# Patient Record
Sex: FEMALE | Race: WHITE | Employment: FULL TIME | URBAN - METROPOLITAN AREA
[De-identification: names, ages, dates, MRNs, and addresses within clinical notes are randomized per-mention and may not be internally consistent; named-entity substitution may affect disease eponyms.]

---

## 2017-04-17 ENCOUNTER — HOSPITAL ENCOUNTER (EMERGENCY)
Facility: HOSPITAL | Age: 47
Discharge: HOME | End: 2017-04-17
Attending: EMERGENCY MEDICINE
Payer: COMMERCIAL

## 2017-04-17 ENCOUNTER — APPOINTMENT (OUTPATIENT)
Dept: GENERAL RADIOLOGY | Facility: HOSPITAL | Age: 47
End: 2017-04-17
Attending: EMERGENCY MEDICINE
Payer: COMMERCIAL

## 2017-04-17 VITALS
DIASTOLIC BLOOD PRESSURE: 82 MMHG | HEART RATE: 101 BPM | RESPIRATION RATE: 20 BRPM | OXYGEN SATURATION: 96 % | HEIGHT: 65 IN | SYSTOLIC BLOOD PRESSURE: 149 MMHG | BODY MASS INDEX: 47.82 KG/M2 | WEIGHT: 287 LBS

## 2017-04-17 DIAGNOSIS — R00.2 PALPITATIONS: Primary | ICD-10-CM

## 2017-04-17 PROCEDURE — 80048 BASIC METABOLIC PNL TOTAL CA: CPT | Performed by: EMERGENCY MEDICINE

## 2017-04-17 PROCEDURE — 71010 XR CHEST AP PORTABLE  (CPT=71010): CPT

## 2017-04-17 PROCEDURE — 93010 ELECTROCARDIOGRAM REPORT: CPT | Performed by: EMERGENCY MEDICINE

## 2017-04-17 PROCEDURE — 99285 EMERGENCY DEPT VISIT HI MDM: CPT

## 2017-04-17 PROCEDURE — 84484 ASSAY OF TROPONIN QUANT: CPT | Performed by: EMERGENCY MEDICINE

## 2017-04-17 PROCEDURE — 93005 ELECTROCARDIOGRAM TRACING: CPT

## 2017-04-17 PROCEDURE — 85610 PROTHROMBIN TIME: CPT | Performed by: EMERGENCY MEDICINE

## 2017-04-17 PROCEDURE — 36415 COLL VENOUS BLD VENIPUNCTURE: CPT

## 2017-04-17 PROCEDURE — 84443 ASSAY THYROID STIM HORMONE: CPT | Performed by: EMERGENCY MEDICINE

## 2017-04-17 PROCEDURE — 85025 COMPLETE CBC W/AUTO DIFF WBC: CPT | Performed by: EMERGENCY MEDICINE

## 2017-04-17 PROCEDURE — 85379 FIBRIN DEGRADATION QUANT: CPT | Performed by: EMERGENCY MEDICINE

## 2017-04-17 NOTE — ED PROVIDER NOTES
Patient Seen in: [facility] AND [facility] Emergency Department    History   Patient presents with:  Arrythmia/Palpitations (cardiovascular)    Stated Complaint: palpitations    HPI    56year old female who is otherwise healthy but reports that she has been sly Indications: Underactive Thyroid   aspirin 81 MG Oral Tab,  Take 81 mg by mouth daily.  Indications: pregnancy       Family History   Problem Relation Age of Onset   • Heart Disorder Father          Smoking Status: Never Smoker                      Alcohol reviewed.           ED Course     Labs Reviewed   BASIC METABOLIC PANEL (8) - Abnormal; Notable for the following:     Glucose 148 (*)     GFR, Non- 59 (*)     All other components within normal limits   CBC W/ DIFFERENTIAL - Abnormal; Notab including sob, cp, palpitations, or other acute concerns.        Disposition and Plan     Clinical Impression:  Palpitations  (primary encounter diagnosis)    Disposition:  Discharge    Follow-up:  Your primary care doctor    In 2 days  Call for next availa

## 2017-04-17 NOTE — ED NOTES
Pt to Ed from work with c/o chest pressure and heart palpitations since this am after drinking coffee and taking diet supplement.  Pt hr is 130 no sob at this time skin warm dry and normal in color   Labs drawn ekg done and cxr

## 2017-04-17 NOTE — ED INITIAL ASSESSMENT (HPI)
Pt c/o heart racing while sitting at her desk at work.  Feeling discomfort to her chest. Taking spirinolactone for adult acne and drank a boost this morning

## 2018-05-29 ENCOUNTER — OFFICE VISIT (OUTPATIENT)
Dept: INTERNAL MEDICINE CLINIC | Facility: CLINIC | Age: 48
End: 2018-05-29

## 2018-05-29 VITALS
WEIGHT: 293 LBS | BODY MASS INDEX: 48.23 KG/M2 | TEMPERATURE: 98 F | HEIGHT: 65.5 IN | OXYGEN SATURATION: 97 % | SYSTOLIC BLOOD PRESSURE: 122 MMHG | DIASTOLIC BLOOD PRESSURE: 82 MMHG | RESPIRATION RATE: 16 BRPM | HEART RATE: 81 BPM

## 2018-05-29 DIAGNOSIS — Z13.220 LIPID SCREENING: ICD-10-CM

## 2018-05-29 DIAGNOSIS — Z00.00 LABORATORY EXAMINATION ORDERED AS PART OF A ROUTINE GENERAL MEDICAL EXAMINATION: ICD-10-CM

## 2018-05-29 DIAGNOSIS — E04.1 THYROID NODULE: ICD-10-CM

## 2018-05-29 DIAGNOSIS — Z13.29 THYROID DISORDER SCREEN: ICD-10-CM

## 2018-05-29 DIAGNOSIS — Z00.00 PHYSICAL EXAM, ANNUAL: Primary | ICD-10-CM

## 2018-05-29 DIAGNOSIS — Z12.31 ENCOUNTER FOR SCREENING MAMMOGRAM FOR MALIGNANT NEOPLASM OF BREAST: ICD-10-CM

## 2018-05-29 DIAGNOSIS — Z13.0 SCREENING, ANEMIA, DEFICIENCY, IRON: ICD-10-CM

## 2018-05-29 DIAGNOSIS — Z13.89 SCREENING FOR GENITOURINARY CONDITION: ICD-10-CM

## 2018-05-29 PROBLEM — G25.0 ESSENTIAL TREMOR: Status: ACTIVE | Noted: 2018-05-29

## 2018-05-29 PROCEDURE — 99386 PREV VISIT NEW AGE 40-64: CPT | Performed by: INTERNAL MEDICINE

## 2018-05-29 NOTE — PROGRESS NOTES
HPI:    Patient ID: Navdeep Ceja is a 53year old female. HPI  HPI:   Navdeep Ceja is a 53year old female who presents for a complete physical exam. Symptoms: denies discharge, itching, burning or dysuria, periods are regular.  Patient complains minimal.  Diet: watches minimally     REVIEW OF SYSTEMS:   GENERAL: feels well otherwise  SKIN: denies any unusual skin lesions  EYES:denies blurred vision or double vision  HEENT: denies nasal congestion, sinus pain or ST  LUNGS: denies shortness of breat for CPX in 12 m. Review of Systems         Current Outpatient Prescriptions:  Acidophilus/Pectin Oral Cap Take 1 capsule by mouth daily. Disp:  Rfl:    Loperamide HCl 2 MG Oral Cap Take 2 mg by mouth 4 (four) times daily as needed for Diarrhea.  Disp:  R

## 2018-06-05 ENCOUNTER — LAB ENCOUNTER (OUTPATIENT)
Dept: LAB | Age: 48
End: 2018-06-05
Attending: INTERNAL MEDICINE
Payer: COMMERCIAL

## 2018-06-05 DIAGNOSIS — Z00.00 LABORATORY EXAMINATION ORDERED AS PART OF A ROUTINE GENERAL MEDICAL EXAMINATION: ICD-10-CM

## 2018-06-05 DIAGNOSIS — Z13.0 SCREENING, ANEMIA, DEFICIENCY, IRON: ICD-10-CM

## 2018-06-05 DIAGNOSIS — Z13.220 LIPID SCREENING: ICD-10-CM

## 2018-06-05 DIAGNOSIS — Z13.29 THYROID DISORDER SCREEN: ICD-10-CM

## 2018-06-05 DIAGNOSIS — Z13.89 SCREENING FOR GENITOURINARY CONDITION: ICD-10-CM

## 2018-06-05 PROCEDURE — 80053 COMPREHEN METABOLIC PANEL: CPT

## 2018-06-05 PROCEDURE — 81001 URINALYSIS AUTO W/SCOPE: CPT

## 2018-06-05 PROCEDURE — 84443 ASSAY THYROID STIM HORMONE: CPT

## 2018-06-05 PROCEDURE — 85025 COMPLETE CBC W/AUTO DIFF WBC: CPT

## 2018-06-05 PROCEDURE — 83036 HEMOGLOBIN GLYCOSYLATED A1C: CPT

## 2018-06-05 PROCEDURE — 36415 COLL VENOUS BLD VENIPUNCTURE: CPT

## 2018-06-05 PROCEDURE — 80061 LIPID PANEL: CPT

## 2018-06-08 ENCOUNTER — TELEPHONE (OUTPATIENT)
Dept: INTERNAL MEDICINE CLINIC | Facility: CLINIC | Age: 48
End: 2018-06-08

## 2018-06-08 ENCOUNTER — HOSPITAL ENCOUNTER (OUTPATIENT)
Dept: MAMMOGRAPHY | Age: 48
Discharge: HOME | End: 2018-06-08
Attending: INTERNAL MEDICINE
Payer: COMMERCIAL

## 2018-06-08 ENCOUNTER — HOSPITAL ENCOUNTER (OUTPATIENT)
Dept: ULTRASOUND IMAGING | Age: 48
Discharge: HOME | End: 2018-06-08
Attending: INTERNAL MEDICINE
Payer: COMMERCIAL

## 2018-06-08 DIAGNOSIS — Z12.31 ENCOUNTER FOR SCREENING MAMMOGRAM FOR MALIGNANT NEOPLASM OF BREAST: ICD-10-CM

## 2018-06-08 DIAGNOSIS — R92.8 ABNORMAL MAMMOGRAM: Primary | ICD-10-CM

## 2018-06-08 DIAGNOSIS — E04.1 THYROID NODULE: ICD-10-CM

## 2018-06-08 PROCEDURE — 77067 SCR MAMMO BI INCL CAD: CPT | Performed by: INTERNAL MEDICINE

## 2018-06-08 PROCEDURE — 76536 US EXAM OF HEAD AND NECK: CPT | Performed by: INTERNAL MEDICINE

## 2018-06-08 PROCEDURE — 77063 BREAST TOMOSYNTHESIS BI: CPT | Performed by: INTERNAL MEDICINE

## 2018-06-08 NOTE — TELEPHONE ENCOUNTER
----- Message from Alvina Kirkland MD sent at 6/8/2018 11:29 AM CDT -----  Small nodule (right), recheck thyroid US in 1 year to ensure stability

## 2018-06-14 ENCOUNTER — TELEPHONE (OUTPATIENT)
Dept: INTERNAL MEDICINE CLINIC | Facility: CLINIC | Age: 48
End: 2018-06-14

## 2018-06-14 ENCOUNTER — HOSPITAL ENCOUNTER (OUTPATIENT)
Dept: MAMMOGRAPHY | Age: 48
Discharge: HOME | End: 2018-06-14
Attending: INTERNAL MEDICINE
Payer: COMMERCIAL

## 2018-06-14 ENCOUNTER — HOSPITAL ENCOUNTER (OUTPATIENT)
Dept: ULTRASOUND IMAGING | Age: 48
Discharge: HOME | End: 2018-06-14
Attending: INTERNAL MEDICINE
Payer: COMMERCIAL

## 2018-06-14 DIAGNOSIS — N60.01 CYST OF RIGHT BREAST: Primary | ICD-10-CM

## 2018-06-14 DIAGNOSIS — R92.1 BREAST CALCIFICATION, LEFT: ICD-10-CM

## 2018-06-14 DIAGNOSIS — R92.8 ABNORMAL MAMMOGRAM: ICD-10-CM

## 2018-06-14 PROCEDURE — 76642 ULTRASOUND BREAST LIMITED: CPT | Performed by: INTERNAL MEDICINE

## 2018-06-14 PROCEDURE — 77066 DX MAMMO INCL CAD BI: CPT | Performed by: INTERNAL MEDICINE

## 2018-06-14 PROCEDURE — 77062 BREAST TOMOSYNTHESIS BI: CPT | Performed by: INTERNAL MEDICINE

## 2018-06-14 NOTE — TELEPHONE ENCOUNTER
----- Message from Pepe Almonte MD sent at 6/14/2018  9:25 AM CDT -----  Pt notified by radiology to have mammogram in 6 m

## 2019-03-12 ENCOUNTER — OFFICE VISIT (OUTPATIENT)
Dept: INTERNAL MEDICINE CLINIC | Facility: CLINIC | Age: 49
End: 2019-03-12
Payer: COMMERCIAL

## 2019-03-12 VITALS
RESPIRATION RATE: 16 BRPM | HEIGHT: 65.5 IN | OXYGEN SATURATION: 98 % | DIASTOLIC BLOOD PRESSURE: 84 MMHG | HEART RATE: 86 BPM | TEMPERATURE: 98 F | BODY MASS INDEX: 48 KG/M2 | SYSTOLIC BLOOD PRESSURE: 138 MMHG

## 2019-03-12 DIAGNOSIS — J02.9 SORE THROAT: Primary | ICD-10-CM

## 2019-03-12 DIAGNOSIS — J02.0 STREP THROAT: ICD-10-CM

## 2019-03-12 LAB
CONTROL LINE PRESENT WITH A CLEAR BACKGROUND (YES/NO): YES YES/NO
STREP GRP A CUL-SCR: POSITIVE

## 2019-03-12 PROCEDURE — 99213 OFFICE O/P EST LOW 20 MIN: CPT | Performed by: PHYSICIAN ASSISTANT

## 2019-03-12 PROCEDURE — 87880 STREP A ASSAY W/OPTIC: CPT | Performed by: PHYSICIAN ASSISTANT

## 2019-03-12 RX ORDER — AMOXICILLIN 875 MG/1
875 TABLET, COATED ORAL 2 TIMES DAILY
Qty: 20 TABLET | Refills: 0 | Status: SHIPPED | OUTPATIENT
Start: 2019-03-12 | End: 2019-07-24

## 2019-03-12 NOTE — PROGRESS NOTES
HPI:   Buffy Preciado is a 53year old female who presents for upper respiratory symptoms for  3  days. Patient reports sore throat, white spots on tonsils, chills, right ear 'tickling'/pain.  Patient denies swollen glands, cough, chest congestion or cassi Pulse 86   Temp 98.3 °F (36.8 °C) (Oral)   Resp 16   Ht 65.5\"   LMP 03/08/2019 (Approximate)   SpO2 98%   BMI 48.34 kg/m²   GENERAL: well developed, well nourished,in no apparent distress  SKIN: no rashes, no suspicious lesions  EYES: PERRLA, EOMI, conjun

## 2019-03-29 ENCOUNTER — TELEPHONE (OUTPATIENT)
Dept: INTERNAL MEDICINE CLINIC | Facility: CLINIC | Age: 49
End: 2019-03-29

## 2019-03-29 DIAGNOSIS — N60.01 CYST OF RIGHT BREAST: Primary | ICD-10-CM

## 2019-03-29 DIAGNOSIS — R92.1 BREAST CALCIFICATION, LEFT: ICD-10-CM

## 2019-03-29 NOTE — TELEPHONE ENCOUNTER
The pt is scheduled to have a bilateral diagnostic mammogram completed to FU on right breast cyst and a left breast calcification. The radiology department is needing a bilateral complete Breast US order entered in case additional views are needed.  The ord

## 2019-04-04 ENCOUNTER — HOSPITAL ENCOUNTER (OUTPATIENT)
Dept: MAMMOGRAPHY | Age: 49
Discharge: HOME | End: 2019-04-04
Attending: INTERNAL MEDICINE
Payer: COMMERCIAL

## 2019-04-04 DIAGNOSIS — R92.1 BREAST CALCIFICATIONS: Primary | ICD-10-CM

## 2019-04-04 DIAGNOSIS — N60.01 CYST OF RIGHT BREAST: ICD-10-CM

## 2019-04-04 DIAGNOSIS — R92.1 BREAST CALCIFICATION, LEFT: ICD-10-CM

## 2019-04-04 PROCEDURE — 77066 DX MAMMO INCL CAD BI: CPT | Performed by: INTERNAL MEDICINE

## 2019-04-04 PROCEDURE — 77062 BREAST TOMOSYNTHESIS BI: CPT | Performed by: INTERNAL MEDICINE

## 2019-06-05 ENCOUNTER — TELEPHONE (OUTPATIENT)
Dept: INTERNAL MEDICINE CLINIC | Facility: CLINIC | Age: 49
End: 2019-06-05

## 2019-06-05 DIAGNOSIS — E04.1 RIGHT THYROID NODULE: Primary | ICD-10-CM

## 2019-06-05 NOTE — TELEPHONE ENCOUNTER
Pt due for 1 year thyroid u/s f/u. Order placed and message left informing pt to schedule and with # to schedule.

## 2019-07-24 ENCOUNTER — OFFICE VISIT (OUTPATIENT)
Dept: INTERNAL MEDICINE CLINIC | Facility: CLINIC | Age: 49
End: 2019-07-24
Payer: COMMERCIAL

## 2019-07-24 VITALS
HEIGHT: 65.5 IN | BODY MASS INDEX: 48.23 KG/M2 | DIASTOLIC BLOOD PRESSURE: 80 MMHG | SYSTOLIC BLOOD PRESSURE: 108 MMHG | TEMPERATURE: 98 F | RESPIRATION RATE: 20 BRPM | WEIGHT: 293 LBS | HEART RATE: 113 BPM

## 2019-07-24 DIAGNOSIS — Z13.0 SCREENING FOR ENDOCRINE, NUTRITIONAL, METABOLIC AND IMMUNITY DISORDER: ICD-10-CM

## 2019-07-24 DIAGNOSIS — Z13.29 SCREENING FOR ENDOCRINE, NUTRITIONAL, METABOLIC AND IMMUNITY DISORDER: ICD-10-CM

## 2019-07-24 DIAGNOSIS — Z13.220 SCREENING FOR LIPID DISORDERS: ICD-10-CM

## 2019-07-24 DIAGNOSIS — Z13.228 SCREENING FOR ENDOCRINE, NUTRITIONAL, METABOLIC AND IMMUNITY DISORDER: ICD-10-CM

## 2019-07-24 DIAGNOSIS — Z12.4 SCREENING FOR CERVICAL CANCER: ICD-10-CM

## 2019-07-24 DIAGNOSIS — R05.9 COUGH: Primary | ICD-10-CM

## 2019-07-24 DIAGNOSIS — Z13.89 SCREENING FOR GENITOURINARY CONDITION: ICD-10-CM

## 2019-07-24 DIAGNOSIS — Z13.1 SCREENING FOR DIABETES MELLITUS: ICD-10-CM

## 2019-07-24 DIAGNOSIS — Z13.21 SCREENING FOR ENDOCRINE, NUTRITIONAL, METABOLIC AND IMMUNITY DISORDER: ICD-10-CM

## 2019-07-24 PROCEDURE — 99213 OFFICE O/P EST LOW 20 MIN: CPT | Performed by: NURSE PRACTITIONER

## 2019-07-24 RX ORDER — PREDNISONE 20 MG/1
40 TABLET ORAL DAILY
Qty: 14 TABLET | Refills: 0 | Status: SHIPPED | OUTPATIENT
Start: 2019-07-24 | End: 2019-07-31

## 2019-07-24 RX ORDER — PSEUDOEPHEDRINE HYDROCHLORIDE 30 MG/1
30 TABLET ORAL EVERY 4 HOURS PRN
COMMUNITY
End: 2019-11-14 | Stop reason: ALTCHOICE

## 2019-07-24 NOTE — PROGRESS NOTES
HPI:    Patient ID: Willy Smith is a 51year old female. Patient presents with:  Cough: end of June      Viral cold about 3 weeks ago    Cough   This is a recurrent problem. The current episode started 1 to 4 weeks ago.  The problem has been waxing Marital status:       Spouse name: Not on file      Number of children: Not on file      Years of education: Not on file      Highest education level: Not on file    Tobacco Use      Smoking status: Never Smoker      Smokeless tobacco: Never Used Results   Component Value Date    CHOLEST 170 06/05/2018    TRIG 221 (H) 06/05/2018    HDL 49 06/05/2018    LDL 77 06/05/2018    VLDL 44 (H) 06/05/2018    TCHDLRATIO 3.47 06/05/2018    [place] 121 06/05/2018     Lab Results   Component Value Date    EAG 11 Future  -     COMP METABOLIC PANEL (14);  Future  -     TSH W REFLEX TO FREE T4; Future  -     URINALYSIS, ROUTINE; Future    Screening for diabetes mellitus  -     HEMOGLOBIN A1C; Future        Jennifer Bowman, APRN

## 2019-08-05 ENCOUNTER — TELEPHONE (OUTPATIENT)
Dept: INTERNAL MEDICINE CLINIC | Facility: CLINIC | Age: 49
End: 2019-08-05

## 2019-08-05 DIAGNOSIS — R05.9 COUGH: Primary | ICD-10-CM

## 2019-08-05 NOTE — TELEPHONE ENCOUNTER
He pt was seen in the office on 7/24/2019 for a cough. The pt was treated with prednisone 40mg daily for 7 days. The pt finished the prednisone 5 days ago. The pt states the symptoms have \"marginally\" improved.  The pt is still having a continued coug

## 2019-08-05 NOTE — TELEPHONE ENCOUNTER
Pt saw Kimberly Junior on 7/24 said if the treatment didn't work she could have a lung x ray ordered. Pt is looking for a lung x ray order now.

## 2019-08-07 ENCOUNTER — HOSPITAL ENCOUNTER (OUTPATIENT)
Dept: GENERAL RADIOLOGY | Age: 49
Discharge: HOME | End: 2019-08-07
Attending: INTERNAL MEDICINE
Payer: COMMERCIAL

## 2019-08-07 ENCOUNTER — TELEPHONE (OUTPATIENT)
Dept: INTERNAL MEDICINE CLINIC | Facility: CLINIC | Age: 49
End: 2019-08-07

## 2019-08-07 ENCOUNTER — APPOINTMENT (OUTPATIENT)
Dept: GENERAL RADIOLOGY | Age: 49
End: 2019-08-07
Attending: INTERNAL MEDICINE
Payer: COMMERCIAL

## 2019-08-07 ENCOUNTER — LAB ENCOUNTER (OUTPATIENT)
Dept: LAB | Age: 49
End: 2019-08-07
Attending: NURSE PRACTITIONER
Payer: COMMERCIAL

## 2019-08-07 ENCOUNTER — HOSPITAL ENCOUNTER (OUTPATIENT)
Dept: ULTRASOUND IMAGING | Age: 49
Discharge: HOME | End: 2019-08-07
Attending: INTERNAL MEDICINE
Payer: COMMERCIAL

## 2019-08-07 DIAGNOSIS — Z13.89 SCREENING FOR GENITOURINARY CONDITION: ICD-10-CM

## 2019-08-07 DIAGNOSIS — R05.9 COUGH: ICD-10-CM

## 2019-08-07 DIAGNOSIS — Z13.228 SCREENING FOR ENDOCRINE, NUTRITIONAL, METABOLIC AND IMMUNITY DISORDER: ICD-10-CM

## 2019-08-07 DIAGNOSIS — R05.3 CHRONIC COUGH: Primary | ICD-10-CM

## 2019-08-07 DIAGNOSIS — E04.1 RIGHT THYROID NODULE: ICD-10-CM

## 2019-08-07 DIAGNOSIS — Z13.0 SCREENING FOR ENDOCRINE, NUTRITIONAL, METABOLIC AND IMMUNITY DISORDER: ICD-10-CM

## 2019-08-07 DIAGNOSIS — Z13.220 SCREENING FOR LIPID DISORDERS: ICD-10-CM

## 2019-08-07 DIAGNOSIS — Z13.1 SCREENING FOR DIABETES MELLITUS: ICD-10-CM

## 2019-08-07 DIAGNOSIS — Z13.21 SCREENING FOR ENDOCRINE, NUTRITIONAL, METABOLIC AND IMMUNITY DISORDER: ICD-10-CM

## 2019-08-07 DIAGNOSIS — Z13.29 SCREENING FOR ENDOCRINE, NUTRITIONAL, METABOLIC AND IMMUNITY DISORDER: ICD-10-CM

## 2019-08-07 LAB
ALBUMIN SERPL-MCNC: 3.4 G/DL (ref 3.4–5)
ALBUMIN/GLOB SERPL: 1 {RATIO} (ref 1–2)
ALP LIVER SERPL-CCNC: 62 U/L (ref 39–100)
ALT SERPL-CCNC: 27 U/L (ref 13–56)
ANION GAP SERPL CALC-SCNC: 6 MMOL/L (ref 0–18)
AST SERPL-CCNC: 16 U/L (ref 15–37)
BASOPHILS # BLD AUTO: 0.02 X10(3) UL (ref 0–0.2)
BASOPHILS NFR BLD AUTO: 0.3 %
BILIRUB SERPL-MCNC: 0.5 MG/DL (ref 0.1–2)
BILIRUB UR QL STRIP.AUTO: NEGATIVE
BUN BLD-MCNC: 12 MG/DL (ref 7–18)
BUN/CREAT SERPL: 11.9 (ref 10–20)
CALCIUM BLD-MCNC: 9.3 MG/DL (ref 8.5–10.1)
CHLORIDE SERPL-SCNC: 106 MMOL/L (ref 98–112)
CHOLEST SMN-MCNC: 175 MG/DL (ref ?–200)
CO2 SERPL-SCNC: 27 MMOL/L (ref 21–32)
CREAT BLD-MCNC: 1.01 MG/DL (ref 0.55–1.02)
DEPRECATED RDW RBC AUTO: 47.2 FL (ref 35.1–46.3)
EOSINOPHIL # BLD AUTO: 0.29 X10(3) UL (ref 0–0.7)
EOSINOPHIL NFR BLD AUTO: 3.9 %
ERYTHROCYTE [DISTWIDTH] IN BLOOD BY AUTOMATED COUNT: 17.4 % (ref 11–15)
EST. AVERAGE GLUCOSE BLD GHB EST-MCNC: 123 MG/DL (ref 68–126)
GLOBULIN PLAS-MCNC: 3.5 G/DL (ref 2.8–4.4)
GLUCOSE BLD-MCNC: 85 MG/DL (ref 70–99)
GLUCOSE UR STRIP.AUTO-MCNC: NEGATIVE MG/DL
HBA1C MFR BLD HPLC: 5.9 % (ref ?–5.7)
HCT VFR BLD AUTO: 40.5 % (ref 35–48)
HDLC SERPL-MCNC: 50 MG/DL (ref 40–59)
HGB BLD-MCNC: 12.4 G/DL (ref 12–16)
IMM GRANULOCYTES # BLD AUTO: 0.05 X10(3) UL (ref 0–1)
IMM GRANULOCYTES NFR BLD: 0.7 %
KETONES UR STRIP.AUTO-MCNC: NEGATIVE MG/DL
LDLC SERPL CALC-MCNC: 85 MG/DL (ref ?–100)
LYMPHOCYTES # BLD AUTO: 2.06 X10(3) UL (ref 1–4)
LYMPHOCYTES NFR BLD AUTO: 27.6 %
M PROTEIN MFR SERPL ELPH: 6.9 G/DL (ref 6.4–8.2)
MCH RBC QN AUTO: 23.9 PG (ref 26–34)
MCHC RBC AUTO-ENTMCNC: 30.6 G/DL (ref 31–37)
MCV RBC AUTO: 78 FL (ref 80–100)
MONOCYTES # BLD AUTO: 0.49 X10(3) UL (ref 0.1–1)
MONOCYTES NFR BLD AUTO: 6.6 %
NEUTROPHILS # BLD AUTO: 4.55 X10 (3) UL (ref 1.5–7.7)
NEUTROPHILS # BLD AUTO: 4.55 X10(3) UL (ref 1.5–7.7)
NEUTROPHILS NFR BLD AUTO: 60.9 %
NITRITE UR QL STRIP.AUTO: NEGATIVE
NONHDLC SERPL-MCNC: 125 MG/DL (ref ?–130)
OSMOLALITY SERPL CALC.SUM OF ELEC: 287 MOSM/KG (ref 275–295)
PH UR STRIP.AUTO: 7 [PH] (ref 4.5–8)
PLATELET # BLD AUTO: 341 10(3)UL (ref 150–450)
POTASSIUM SERPL-SCNC: 3.8 MMOL/L (ref 3.5–5.1)
PROT UR STRIP.AUTO-MCNC: 30 MG/DL
RBC # BLD AUTO: 5.19 X10(6)UL (ref 3.8–5.3)
RBC #/AREA URNS AUTO: >10 /HPF
SODIUM SERPL-SCNC: 139 MMOL/L (ref 136–145)
SP GR UR STRIP.AUTO: 1.01 (ref 1–1.03)
TRIGL SERPL-MCNC: 201 MG/DL (ref 30–149)
TSI SER-ACNC: 1.94 MIU/ML (ref 0.36–3.74)
UROBILINOGEN UR STRIP.AUTO-MCNC: <2 MG/DL
VLDLC SERPL CALC-MCNC: 40 MG/DL (ref 0–30)
WBC # BLD AUTO: 7.5 X10(3) UL (ref 4–11)

## 2019-08-07 PROCEDURE — 83036 HEMOGLOBIN GLYCOSYLATED A1C: CPT | Performed by: NURSE PRACTITIONER

## 2019-08-07 PROCEDURE — 71046 X-RAY EXAM CHEST 2 VIEWS: CPT | Performed by: INTERNAL MEDICINE

## 2019-08-07 PROCEDURE — 76536 US EXAM OF HEAD AND NECK: CPT | Performed by: INTERNAL MEDICINE

## 2019-08-07 PROCEDURE — 80061 LIPID PANEL: CPT | Performed by: NURSE PRACTITIONER

## 2019-08-07 PROCEDURE — 81001 URINALYSIS AUTO W/SCOPE: CPT | Performed by: NURSE PRACTITIONER

## 2019-08-07 PROCEDURE — 36415 COLL VENOUS BLD VENIPUNCTURE: CPT | Performed by: NURSE PRACTITIONER

## 2019-08-07 PROCEDURE — 80050 GENERAL HEALTH PANEL: CPT | Performed by: NURSE PRACTITIONER

## 2019-08-07 NOTE — TELEPHONE ENCOUNTER
----- Message from Yahaira Mendez MD sent at 8/7/2019 12:51 PM CDT -----  Normal chest x-ray no infection

## 2019-08-07 NOTE — TELEPHONE ENCOUNTER
D/w pt xray results. Pt reports cough is still present and has been for 2 months. She had 1 course of prednisone 40mg x 7 days end of July. She has been doing OTC mucinex as well.       Pt aware Dr. Lois Garrido is out of the office and this will be addressed to

## 2019-08-08 NOTE — TELEPHONE ENCOUNTER
Per Dr. Hien Agosto refer to Dr. Gurdeep Angel [address]  [place], [address]  [phone]    Referral pending.

## 2019-08-08 NOTE — TELEPHONE ENCOUNTER
----- Message from KEDAR Schulte sent at 8/8/2019  8:36 AM CDT -----  Results reviewed. Is patient currently on menses? Blood sugar elevated, pre-DM range. TSH, lipid, Kidney, CBC, liver, and electrolytes WNL.  Trig slightly elevated

## 2019-08-09 NOTE — TELEPHONE ENCOUNTER
Spoke with pt and relayed results and recommendations. She expressed understanding. Pt was on her menstrual cycle. Referral placed.

## 2019-08-15 ENCOUNTER — OFFICE VISIT (OUTPATIENT)
Dept: INTERNAL MEDICINE CLINIC | Facility: CLINIC | Age: 49
End: 2019-08-15
Payer: COMMERCIAL

## 2019-08-15 VITALS
BODY MASS INDEX: 48.23 KG/M2 | HEIGHT: 65.5 IN | SYSTOLIC BLOOD PRESSURE: 128 MMHG | WEIGHT: 293 LBS | HEART RATE: 84 BPM | DIASTOLIC BLOOD PRESSURE: 88 MMHG | TEMPERATURE: 98 F | RESPIRATION RATE: 16 BRPM

## 2019-08-15 DIAGNOSIS — Z00.00 ANNUAL PHYSICAL EXAM: Primary | ICD-10-CM

## 2019-08-15 PROCEDURE — 99396 PREV VISIT EST AGE 40-64: CPT | Performed by: INTERNAL MEDICINE

## 2019-08-15 NOTE — PROGRESS NOTES
HPI:    Patient ID: Will Leyva is a 51year old female.     HPI  The 10-year ASCVD risk score (Coleen Keller, et al., 2013) is: 1%    Values used to calculate the score:      Age: 51 years      Sex: Female      Is Non- : No (TUMS) 500 MG Oral Chew Tab Chew 1 tablet by mouth daily.  Disp:  Rfl:       Past Medical History:   Diagnosis Date   • Gestational diabetes    • Infertility, female    • Pregnancy induced hypertension    • Unspecified hypothyroidism       Past Surgical His clear  EYES:PERRLA, EOMI, normal optic disk,conjunctiva are clear  NECK: supple,no adenopathy,no bruits  LUNGS: clear to auscultation  CARDIO: RRR without murmur  GI: good BS's,no masses, HSM or tenderness  :deferred  MUSCULOSKELETAL: back is not tender, requested or ordered in this encounter       Imaging & Referrals:  None       JI#[id number]

## 2019-08-15 NOTE — PATIENT INSTRUCTIONS
Prevention Guidelines, Women Ages 36 to 52  Screening tests and vaccines are an important part of managing your health. A screening test is done to find diseases in people who don't have any symptoms.  The goal is to find a disease early so lifestyle hernandez · Flexible sigmoidoscopy every 5 years, or  · Colonoscopy every 10 years, or  · CT colonography (virtual colonoscopy) every 5 years, or  · Yearly fecal occult blood test, or  · Yearly fecal immunochemical test every year, or  · Stool DNA test, every 3 year Chickenpox (varicella) All women in this age group who have no record of this infection or vaccine 2 doses; the second dose should be given at least 4 weeks after the first dose   Hepatitis A Women at increased risk for infection–talk with your healthcare Use of tobacco and the health effects it can cause All women in this age group Every exam   1 American Diabetes Association  2 American College of Obstetricians and Gynecologists   3 [address]  [address] [facility]  Date Last R

## 2019-10-22 ENCOUNTER — OFFICE VISIT (OUTPATIENT)
Dept: INTERNAL MEDICINE CLINIC | Facility: CLINIC | Age: 49
End: 2019-10-22
Payer: COMMERCIAL

## 2019-10-22 VITALS
WEIGHT: 293 LBS | RESPIRATION RATE: 16 BRPM | BODY MASS INDEX: 48.23 KG/M2 | HEART RATE: 88 BPM | OXYGEN SATURATION: 97 % | DIASTOLIC BLOOD PRESSURE: 88 MMHG | TEMPERATURE: 98 F | SYSTOLIC BLOOD PRESSURE: 138 MMHG | HEIGHT: 65.5 IN

## 2019-10-22 DIAGNOSIS — J18.9 ATYPICAL PNEUMONIA: Primary | ICD-10-CM

## 2019-10-22 DIAGNOSIS — Z23 NEED FOR INFLUENZA VACCINATION: ICD-10-CM

## 2019-10-22 PROCEDURE — 99213 OFFICE O/P EST LOW 20 MIN: CPT | Performed by: INTERNAL MEDICINE

## 2019-10-22 PROCEDURE — 90686 IIV4 VACC NO PRSV 0.5 ML IM: CPT | Performed by: INTERNAL MEDICINE

## 2019-10-22 PROCEDURE — 90471 IMMUNIZATION ADMIN: CPT | Performed by: INTERNAL MEDICINE

## 2019-10-22 RX ORDER — BENZONATATE 200 MG/1
200 CAPSULE ORAL 3 TIMES DAILY PRN
Qty: 30 CAPSULE | Refills: 0 | Status: SHIPPED | OUTPATIENT
Start: 2019-10-22 | End: 2019-11-14 | Stop reason: ALTCHOICE

## 2019-10-22 RX ORDER — AZITHROMYCIN 250 MG/1
TABLET, FILM COATED ORAL
Qty: 6 TABLET | Refills: 0 | Status: SHIPPED | OUTPATIENT
Start: 2019-10-22 | End: 2019-11-14

## 2019-10-22 NOTE — PROGRESS NOTES
HPI:    Patient ID: Annette Bedolla is a 51year old female. Cough   This is a new problem. The current episode started 1 to 4 weeks ago. The problem has been gradually worsening. The problem occurs every few minutes. The cough is productive of sputum. Physical Exam   Constitutional: She appears well-developed and well-nourished. No distress. Neck: Normal range of motion. Neck supple. Cardiovascular: Normal rate, regular rhythm and normal heart sounds. No murmur heard.   Pulmonary/Chest: She has n

## 2019-10-22 NOTE — PATIENT INSTRUCTIONS
Lung Anatomy    Your lungs take air in to give your body oxygen, which the body needs to work. Your lungs, like all the tissues in your body, are made up of billions of tiny specialized cells.  Old lung cells die and are replaced by new, identical lung ce

## 2019-10-30 ENCOUNTER — TELEPHONE (OUTPATIENT)
Dept: INTERNAL MEDICINE CLINIC | Facility: CLINIC | Age: 49
End: 2019-10-30

## 2019-10-30 DIAGNOSIS — R05.9 COUGH: Primary | ICD-10-CM

## 2019-10-30 RX ORDER — ALBUTEROL SULFATE 90 UG/1
2 AEROSOL, METERED RESPIRATORY (INHALATION)
Qty: 1 INHALER | Refills: 0 | Status: SHIPPED | OUTPATIENT
Start: 2019-10-30 | End: 2019-11-14 | Stop reason: ALTCHOICE

## 2019-10-30 NOTE — TELEPHONE ENCOUNTER
Patient called to say she finished the z-ingrid prescribed for her pneumonia symptoms, but she is still not feeling well; wants to speak with a nurse on what else we advise for treatment.

## 2019-10-30 NOTE — TELEPHONE ENCOUNTER
Spoke with pt she still has a cough and at times coughing fits. Cough is mostly dry but sometimes productive. Pt is also fatigue. No new symptoms and no fever.      Per Dr Lois Garrido Chest xray and Albuterol 2 puffs every 6 hours await chest xray results to dete

## 2019-11-04 ENCOUNTER — HOSPITAL ENCOUNTER (OUTPATIENT)
Dept: GENERAL RADIOLOGY | Age: 49
Discharge: HOME | End: 2019-11-04
Attending: INTERNAL MEDICINE
Payer: COMMERCIAL

## 2019-11-04 DIAGNOSIS — R05.9 COUGH: ICD-10-CM

## 2019-11-04 PROCEDURE — 71046 X-RAY EXAM CHEST 2 VIEWS: CPT | Performed by: INTERNAL MEDICINE

## 2019-11-05 ENCOUNTER — TELEPHONE (OUTPATIENT)
Dept: INTERNAL MEDICINE CLINIC | Facility: CLINIC | Age: 49
End: 2019-11-05

## 2019-11-05 DIAGNOSIS — R93.89 ABNORMAL CHEST X-RAY: Primary | ICD-10-CM

## 2019-11-05 DIAGNOSIS — R05.9 COUGH: ICD-10-CM

## 2019-11-05 NOTE — TELEPHONE ENCOUNTER
----- Message from Georgia Kurtz MD sent at 11/5/2019  8:51 AM CST -----  focal consolidation or infection. 1.2 cm right apical opacity, recommend CT of the chest for further evaluation.

## 2019-11-05 NOTE — TELEPHONE ENCOUNTER
Per radiology CT chest without contrast.     D/w pt results and recommendations. She expressed understanding and will schedule once notified by referral team.    Order placed.

## 2019-11-10 ENCOUNTER — WALK IN (OUTPATIENT)
Dept: URGENT CARE | Age: 49
End: 2019-11-10

## 2019-11-10 VITALS
RESPIRATION RATE: 20 BRPM | HEIGHT: 65 IN | TEMPERATURE: 98.1 F | BODY MASS INDEX: 48.15 KG/M2 | DIASTOLIC BLOOD PRESSURE: 108 MMHG | WEIGHT: 289 LBS | HEART RATE: 104 BPM | SYSTOLIC BLOOD PRESSURE: 152 MMHG

## 2019-11-10 DIAGNOSIS — J00 ACUTE NASOPHARYNGITIS: Primary | ICD-10-CM

## 2019-11-10 DIAGNOSIS — R03.0 ELEVATED BLOOD-PRESSURE READING WITHOUT DIAGNOSIS OF HYPERTENSION: ICD-10-CM

## 2019-11-10 LAB
INTERNAL PROCEDURAL CONTROLS ACCEPTABLE: YES
S PYO AG THROAT QL IA.RAPID: NEGATIVE

## 2019-11-10 PROCEDURE — 87880 STREP A ASSAY W/OPTIC: CPT | Performed by: NURSE PRACTITIONER

## 2019-11-10 PROCEDURE — 99203 OFFICE O/P NEW LOW 30 MIN: CPT | Performed by: NURSE PRACTITIONER

## 2019-11-10 RX ORDER — DEXTROMETHORPHAN HYDROBROMIDE AND PROMETHAZINE HYDROCHLORIDE 15; 6.25 MG/5ML; MG/5ML
5 SYRUP ORAL
Qty: 118 ML | Refills: 0 | Status: SHIPPED | OUTPATIENT
Start: 2019-11-10

## 2019-11-10 RX ORDER — BENZONATATE 200 MG/1
CAPSULE ORAL
Refills: 0 | COMMUNITY
Start: 2019-10-22

## 2019-11-10 RX ORDER — BENZONATATE 200 MG/1
200 CAPSULE ORAL 3 TIMES DAILY PRN
Qty: 20 CAPSULE | Refills: 0 | Status: SHIPPED | OUTPATIENT
Start: 2019-11-10

## 2019-11-10 SDOH — HEALTH STABILITY: PHYSICAL HEALTH: ON AVERAGE, HOW MANY DAYS PER WEEK DO YOU ENGAGE IN MODERATE TO STRENUOUS EXERCISE (LIKE A BRISK WALK)?: 0 DAYS

## 2019-11-10 ASSESSMENT — ENCOUNTER SYMPTOMS
ALLERGIC/IMMUNOLOGIC COMMENTS: SEASONAL
WHEEZING: 1
ROS GI COMMENTS: SEE HPI
SHORTNESS OF BREATH: 1
EYES NEGATIVE: 1
COUGH: 1
STRIDOR: 0
NEUROLOGICAL NEGATIVE: 1

## 2019-11-11 ENCOUNTER — HOSPITAL ENCOUNTER (EMERGENCY)
Age: 49
Discharge: HOME | End: 2019-11-11
Attending: EMERGENCY MEDICINE
Payer: COMMERCIAL

## 2019-11-11 VITALS
HEIGHT: 65 IN | WEIGHT: 292 LBS | BODY MASS INDEX: 48.65 KG/M2 | TEMPERATURE: 98 F | DIASTOLIC BLOOD PRESSURE: 93 MMHG | RESPIRATION RATE: 16 BRPM | HEART RATE: 111 BPM | SYSTOLIC BLOOD PRESSURE: 161 MMHG | OXYGEN SATURATION: 100 %

## 2019-11-11 DIAGNOSIS — J01.90 ACUTE SINUSITIS, RECURRENCE NOT SPECIFIED, UNSPECIFIED LOCATION: Primary | ICD-10-CM

## 2019-11-11 DIAGNOSIS — I10 HYPERTENSION, UNSPECIFIED TYPE: ICD-10-CM

## 2019-11-11 PROCEDURE — 99282 EMERGENCY DEPT VISIT SF MDM: CPT

## 2019-11-11 RX ORDER — DEXTROMETHORPHAN HYDROBROMIDE AND PROMETHAZINE HYDROCHLORIDE 15; 6.25 MG/5ML; MG/5ML
SYRUP ORAL 4 TIMES DAILY PRN
COMMUNITY
End: 2019-11-22 | Stop reason: ALTCHOICE

## 2019-11-11 NOTE — ED PROVIDER NOTES
Patient Seen in: THE [facility] Emergency Department In [place]      History   Patient presents with:  Hypertension (cardiovascular)    Stated Complaint: cough/elevated BP    HPI    Patient has had nearly 1 month of cough, runny nose and sinus congestion.   Co (36.7 °C) (Oral)   Resp 16   Ht 165.1 cm (5' 5\")   Wt 132.5 kg   LMP 10/24/2019   SpO2 100%   BMI 48.59 kg/m²         Physical Exam      Constitutional: Awake, alert, age appearing, non-toxic  Head: Normocephalic and atraumatic.      Eyes: EOM are normal.

## 2019-11-11 NOTE — ED INITIAL ASSESSMENT (HPI)
Pt was recently dx w pneumonia completed her course of antibiotics was given cough meds and told her bp was elevated has an appt w her pcp in one hour and came here concerned about bp.

## 2019-11-14 ENCOUNTER — OFFICE VISIT (OUTPATIENT)
Dept: INTERNAL MEDICINE CLINIC | Facility: CLINIC | Age: 49
End: 2019-11-14
Payer: COMMERCIAL

## 2019-11-14 VITALS
WEIGHT: 292 LBS | RESPIRATION RATE: 20 BRPM | TEMPERATURE: 98 F | OXYGEN SATURATION: 98 % | SYSTOLIC BLOOD PRESSURE: 160 MMHG | DIASTOLIC BLOOD PRESSURE: 100 MMHG | BODY MASS INDEX: 48.65 KG/M2 | HEART RATE: 125 BPM | HEIGHT: 65 IN

## 2019-11-14 DIAGNOSIS — I10 ESSENTIAL HYPERTENSION: ICD-10-CM

## 2019-11-14 DIAGNOSIS — R00.0 TACHYCARDIA: Primary | ICD-10-CM

## 2019-11-14 DIAGNOSIS — J98.01 BRONCHOSPASM: ICD-10-CM

## 2019-11-14 PROCEDURE — 99214 OFFICE O/P EST MOD 30 MIN: CPT | Performed by: INTERNAL MEDICINE

## 2019-11-14 PROCEDURE — 93000 ELECTROCARDIOGRAM COMPLETE: CPT | Performed by: INTERNAL MEDICINE

## 2019-11-14 RX ORDER — AMLODIPINE BESYLATE 5 MG/1
5 TABLET ORAL DAILY
Qty: 90 TABLET | Refills: 0 | Status: SHIPPED | OUTPATIENT
Start: 2019-11-14 | End: 2020-02-20 | Stop reason: ALTCHOICE

## 2019-11-14 RX ORDER — CODEINE PHOSPHATE AND GUAIFENESIN 10; 100 MG/5ML; MG/5ML
10 SOLUTION ORAL EVERY 6 HOURS PRN
Qty: 180 ML | Refills: 0 | Status: SHIPPED | OUTPATIENT
Start: 2019-11-14 | End: 2019-11-22 | Stop reason: ALTCHOICE

## 2019-11-14 RX ORDER — LOSARTAN POTASSIUM 50 MG/1
50 TABLET ORAL DAILY
Qty: 90 TABLET | Refills: 0 | Status: SHIPPED | OUTPATIENT
Start: 2019-11-14 | End: 2020-02-20 | Stop reason: ALTCHOICE

## 2019-11-14 NOTE — PATIENT INSTRUCTIONS
Bronchospasm (Adult)    Bronchospasm occurs when the airways (bronchial tubes) go into spasm and contract. This makes it hard to breathe and causes wheezing (a high-pitched whistling sound). Bronchospasm can also cause frequent coughing without wheezing. If you are age 72 or older, have a chronic lung disease or condition that affects your immune system, or you smoke, ask your healthcare provider about getting a pneumococcal vaccine, as well as a yearly flu shot (influenza vaccine).   When to seek medical a · Unsalted fresh fruit and vegetables, or frozen or canned fruit and vegetables with no added salt  Stay away from:  · Lunch or deli meat that is cured or smoked  · Cheese  · Tomato juice and ketchup  · Canned vegetables, soups, and fish not labeled as no-

## 2019-11-14 NOTE — PROGRESS NOTES
HPI:    Patient ID: Kiera Cobb is a 51year old female. HPI   ER follow up for elevated blood pressure  Patient has had nearly 1 month of cough, runny nose and sinus congestion.   Cough is eased up but her sinus congestion persist.  She has been ta auscultation bilaterally. No wheezes. No rhonchi. Cardiovascular: S1, S2. Regular rate and rhythm. No murmurs, rubs or gallops. Equal pulses. Chest and Back: No tenderness or deformity.   Abdomen: Soft,  no pain.  nontender, nondistended.  Positive bowel

## 2019-11-15 ENCOUNTER — APPOINTMENT (OUTPATIENT)
Dept: LAB | Age: 49
End: 2019-11-15
Attending: INTERNAL MEDICINE
Payer: COMMERCIAL

## 2019-11-15 DIAGNOSIS — I10 ESSENTIAL HYPERTENSION: ICD-10-CM

## 2019-11-15 PROCEDURE — 82088 ASSAY OF ALDOSTERONE: CPT | Performed by: INTERNAL MEDICINE

## 2019-11-15 PROCEDURE — 36415 COLL VENOUS BLD VENIPUNCTURE: CPT | Performed by: INTERNAL MEDICINE

## 2019-11-15 PROCEDURE — 84244 ASSAY OF RENIN: CPT | Performed by: INTERNAL MEDICINE

## 2019-11-16 ENCOUNTER — HOSPITAL ENCOUNTER (OUTPATIENT)
Dept: CT IMAGING | Age: 49
Discharge: HOME | End: 2019-11-16
Attending: INTERNAL MEDICINE
Payer: COMMERCIAL

## 2019-11-16 DIAGNOSIS — R05.9 COUGH: ICD-10-CM

## 2019-11-16 DIAGNOSIS — R93.89 ABNORMAL CHEST X-RAY: ICD-10-CM

## 2019-11-16 PROCEDURE — 71250 CT THORAX DX C-: CPT | Performed by: INTERNAL MEDICINE

## 2019-11-18 ENCOUNTER — HOSPITAL ENCOUNTER (OUTPATIENT)
Dept: CV DIAGNOSTICS | Facility: HOSPITAL | Age: 49
Discharge: HOME | End: 2019-11-18
Attending: INTERNAL MEDICINE
Payer: COMMERCIAL

## 2019-11-18 DIAGNOSIS — I10 ESSENTIAL HYPERTENSION: ICD-10-CM

## 2019-11-18 DIAGNOSIS — R00.0 TACHYCARDIA: ICD-10-CM

## 2019-11-18 PROCEDURE — 93306 TTE W/DOPPLER COMPLETE: CPT | Performed by: INTERNAL MEDICINE

## 2019-11-22 ENCOUNTER — OFFICE VISIT (OUTPATIENT)
Dept: INTERNAL MEDICINE CLINIC | Facility: CLINIC | Age: 49
End: 2019-11-22
Payer: COMMERCIAL

## 2019-11-22 VITALS
OXYGEN SATURATION: 97 % | BODY MASS INDEX: 48.65 KG/M2 | TEMPERATURE: 98 F | DIASTOLIC BLOOD PRESSURE: 84 MMHG | SYSTOLIC BLOOD PRESSURE: 132 MMHG | WEIGHT: 292 LBS | RESPIRATION RATE: 18 BRPM | HEIGHT: 65 IN | HEART RATE: 105 BPM

## 2019-11-22 DIAGNOSIS — I10 ESSENTIAL HYPERTENSION: Primary | ICD-10-CM

## 2019-11-22 PROCEDURE — 99213 OFFICE O/P EST LOW 20 MIN: CPT | Performed by: INTERNAL MEDICINE

## 2019-11-22 NOTE — PROGRESS NOTES
HPI:    Patient ID: Shen Salazar is a 51year old female. Hypertension       Shen Salazar is a 51year old female. HPI:   Patient presents for recheck of her hypertension.  Pt has been taking medications as instructed, no medication side effe Melissa Merida DO at [facility] OR   • EXCIS UTERINE FIBROID,ABD THE [facility]  2009      Social History:    Social History    Tobacco Use      Smoking status: Never Smoker      Smokeless tobacco: Never Used    Alcohol use: No      Frequency: Never      Comment: r OTHER (SEE COMMENTS)    Comment:Pine nuts, oral dryness  Seafood                 ITCHING    Comment:Dry mouth  Seasonal                Runny nose   PHYSICAL EXAM:   Physical Exam           ASSESSMENT/PLAN:   Essential hypertension  (primary encounter diagn

## 2019-11-30 ENCOUNTER — TELEPHONE (OUTPATIENT)
Dept: INTERNAL MEDICINE CLINIC | Facility: CLINIC | Age: 49
End: 2019-11-30

## 2019-11-30 NOTE — TELEPHONE ENCOUNTER
Fax received from [place] regarding patient's short-term disability claim. Form needs to be completed, signed, and faxed back with medical records. [id number], fax #[phone]. Forms in triage.

## 2020-01-06 ENCOUNTER — TELEPHONE (OUTPATIENT)
Dept: INTERNAL MEDICINE CLINIC | Facility: CLINIC | Age: 50
End: 2020-01-06

## 2020-01-06 ENCOUNTER — HOSPITAL ENCOUNTER (OUTPATIENT)
Dept: MAMMOGRAPHY | Age: 50
Discharge: HOME | End: 2020-01-06
Attending: INTERNAL MEDICINE
Payer: COMMERCIAL

## 2020-01-06 ENCOUNTER — HOSPITAL ENCOUNTER (OUTPATIENT)
Dept: ULTRASOUND IMAGING | Age: 50
Discharge: HOME | End: 2020-01-06
Attending: INTERNAL MEDICINE
Payer: COMMERCIAL

## 2020-01-06 DIAGNOSIS — R92.1 BREAST CALCIFICATION, LEFT: ICD-10-CM

## 2020-01-06 DIAGNOSIS — R92.8 ABNORMAL MAMMOGRAM: Primary | ICD-10-CM

## 2020-01-06 DIAGNOSIS — R92.1 BREAST CALCIFICATIONS: ICD-10-CM

## 2020-01-06 DIAGNOSIS — N60.01 CYST OF RIGHT BREAST: ICD-10-CM

## 2020-01-06 PROCEDURE — 76641 ULTRASOUND BREAST COMPLETE: CPT | Performed by: INTERNAL MEDICINE

## 2020-01-06 PROCEDURE — 77066 DX MAMMO INCL CAD BI: CPT | Performed by: INTERNAL MEDICINE

## 2020-01-06 PROCEDURE — 77062 BREAST TOMOSYNTHESIS BI: CPT | Performed by: INTERNAL MEDICINE

## 2020-01-06 NOTE — IMAGING NOTE
Spoke with pt re: RIGHT breast Ultrasound biopsy x  2 site at the recommendation of Dr Vinayak Brandt 4A.  Pt is very emotional/nervous about her biopsy and encouraged to discuss with Dr Randee Mitchell to obtain and bring in Rx for sedative/anti-anxiety and instructe

## 2020-01-06 NOTE — TELEPHONE ENCOUNTER
Spoke with patient regarding test results. Patient expressed understanding of results. Per Dr. Reece Carroll patient to have a biopsy of right breast.     Patient expressed understanding and orders placed.

## 2020-01-06 NOTE — TELEPHONE ENCOUNTER
----- Message from Sudeep Benavidez MD sent at 1/6/2020  2:55 PM CST -----  Have radiology perform biopsy

## 2020-01-07 NOTE — TELEPHONE ENCOUNTER
Per Dr. Lois Garrido ok to take Diazepam 5 mg 30 mins prior to her procedure and repeat at time of procedure.    Orders placed and routed for approval.

## 2020-01-07 NOTE — TELEPHONE ENCOUNTER
From: Fernie Jules  To: Diana Gibbs MD  Sent: 1/7/2020 11:12 AM CST  Subject: Test Results Question    I have a question about [facility] AISHA 2D+3D DIAGNOSTIC [facility] BILAT (MUJ=[id number]) resulted on 1/6/20, 2:51 PM.    For the Order for the Biopsy or howev

## 2020-01-23 ENCOUNTER — HOSPITAL ENCOUNTER (OUTPATIENT)
Dept: ULTRASOUND IMAGING | Age: 50
Discharge: HOME | End: 2020-01-23
Attending: INTERNAL MEDICINE
Payer: COMMERCIAL

## 2020-01-23 ENCOUNTER — HOSPITAL ENCOUNTER (OUTPATIENT)
Dept: MAMMOGRAPHY | Age: 50
Discharge: HOME | End: 2020-01-23
Attending: INTERNAL MEDICINE
Payer: COMMERCIAL

## 2020-01-23 DIAGNOSIS — R92.8 ABNORMAL MAMMOGRAM: ICD-10-CM

## 2020-01-23 PROCEDURE — 88305 TISSUE EXAM BY PATHOLOGIST: CPT | Performed by: INTERNAL MEDICINE

## 2020-01-23 PROCEDURE — 77065 DX MAMMO INCL CAD UNI: CPT | Performed by: INTERNAL MEDICINE

## 2020-01-23 PROCEDURE — 19084 BX BREAST ADD LESION US IMAG: CPT | Performed by: INTERNAL MEDICINE

## 2020-01-23 PROCEDURE — 19083 BX BREAST 1ST LESION US IMAG: CPT | Performed by: INTERNAL MEDICINE

## 2020-01-23 NOTE — IMAGING NOTE
Pt arrived with  an hour prior to her biopsy time. Procedure reviewed, consent obtained and d/c instructions reviewed. Pt arrived with Rx bottle of Valium and instructed she can take as prescribed once consent was obtained.

## 2020-01-23 NOTE — IMAGING NOTE
Assisted Dr Clive Owen with right u/s guided bx x 2 sites, pressure applied x 10 min to each site, no hematoma's noted.  Discharge instructions reviewed/provided written instructions as well, answered pt's questions, provided emotional support, pt rhett procedure

## 2020-01-27 ENCOUNTER — TELEPHONE (OUTPATIENT)
Dept: INTERNAL MEDICINE CLINIC | Facility: CLINIC | Age: 50
End: 2020-01-27

## 2020-01-27 DIAGNOSIS — D36.9 INTRADUCTAL PAPILLOMA: Primary | ICD-10-CM

## 2020-01-27 NOTE — TELEPHONE ENCOUNTER
ALEEM for Genaro Campbell, Breast care coordinator at [facility] with Dr. Nichol Manzo recommendation. Per Dr. Perry Mays: Si Russian Dr. Reyes Hering, MD  [facility]  [address] [facility]  [place] [address], [address]    [phone]    Referral pl

## 2020-01-27 NOTE — TELEPHONE ENCOUNTER
Requesting to have a surgeon who Dr. Jorja Opitz had a mammogram with biopsy. Not malignant but surgeon is requesting surgical consult.      Please call to advise

## 2020-01-28 ENCOUNTER — TELEPHONE (OUTPATIENT)
Dept: CT IMAGING | Facility: HOSPITAL | Age: 50
End: 2020-01-28

## 2020-01-28 ENCOUNTER — TELEPHONE (OUTPATIENT)
Dept: INTERNAL MEDICINE CLINIC | Facility: CLINIC | Age: 50
End: 2020-01-28

## 2020-01-28 NOTE — TELEPHONE ENCOUNTER
VOICEMAIL: pt states she is returning Dr. Gunner Wood call, I do not see a call from him in the system

## 2020-01-28 NOTE — TELEPHONE ENCOUNTER
Telephoned [facility] and left message to call back. Pt called back shortly after leaving message. Notified [facility] of R breast biopsy results and that Dr Perry Mays recommends f/u w. Dr Mukesh Sauer, surgeon.  Pt had questions about surgery consult and qu

## 2020-01-30 ENCOUNTER — OFFICE VISIT (OUTPATIENT)
Dept: SURGERY | Facility: CLINIC | Age: 50
End: 2020-01-30
Payer: COMMERCIAL

## 2020-01-30 VITALS
HEART RATE: 104 BPM | TEMPERATURE: 98 F | DIASTOLIC BLOOD PRESSURE: 87 MMHG | SYSTOLIC BLOOD PRESSURE: 139 MMHG | HEIGHT: 65 IN | WEIGHT: 288 LBS | BODY MASS INDEX: 47.98 KG/M2

## 2020-01-30 DIAGNOSIS — Z98.890 S/P RIGHT BREAST BIOPSY: ICD-10-CM

## 2020-01-30 DIAGNOSIS — E66.01 CLASS 3 SEVERE OBESITY WITH BODY MASS INDEX (BMI) OF 45.0 TO 49.9 IN ADULT, UNSPECIFIED OBESITY TYPE, UNSPECIFIED WHETHER SERIOUS COMORBIDITY PRESENT (HCC): ICD-10-CM

## 2020-01-30 DIAGNOSIS — Z80.3 FAMILY HISTORY OF BREAST CANCER: ICD-10-CM

## 2020-01-30 DIAGNOSIS — D24.1 PAPILLOMA OF RIGHT BREAST: Primary | ICD-10-CM

## 2020-01-30 PROCEDURE — 99244 OFF/OP CNSLTJ NEW/EST MOD 40: CPT | Performed by: SURGERY

## 2020-01-30 RX ORDER — ALBUTEROL SULFATE 90 UG/1
AEROSOL, METERED RESPIRATORY (INHALATION) AS NEEDED
COMMUNITY
Start: 2019-10-30 | End: 2021-09-24

## 2020-01-31 NOTE — H&P
New Patient Visit Note       Active Problems      1. Papilloma of right breast    2. Family history of breast cancer    3.  S/P right breast biopsy        Chief Complaint   Patient presents with:  Breast Problem: NP referred by Dr. Aileen Amanda for breast biopsy/pa total) by mouth daily. , Disp: 90 tablet, Rfl: 0  •  losartan Potassium 50 MG Oral Tab, Take 1 tablet (50 mg total) by mouth daily. , Disp: 90 tablet, Rfl: 0  •  Acidophilus/Pectin Oral Cap, Take 1 capsule by mouth daily. , Disp: , Rfl:   •  Multiple Vitamin Pulmonary/Chest: Effort normal and breath sounds normal.        Abdominal: Soft. Bowel sounds are normal. She exhibits no distension. There is no tenderness. Musculoskeletal: Normal range of motion. General: No deformity.      Neurological: She is definitive pathology.   The possibility of upstaging to in situ or invasive carcinoma was also discussed    At this time Dhiraj Westfall would like to proceed with right breast lumpectomy with localization x2    Assessment   Papilloma of right breast  (primary encount

## 2020-01-31 NOTE — H&P (VIEW-ONLY)
New Patient Visit Note       Active Problems      1. Papilloma of right breast    2. Family history of breast cancer    3.  S/P right breast biopsy        Chief Complaint   Patient presents with:  Breast Problem: NP referred by Dr. Margarita Paz for breast biopsy/pa total) by mouth daily. , Disp: 90 tablet, Rfl: 0  •  losartan Potassium 50 MG Oral Tab, Take 1 tablet (50 mg total) by mouth daily. , Disp: 90 tablet, Rfl: 0  •  Acidophilus/Pectin Oral Cap, Take 1 capsule by mouth daily. , Disp: , Rfl:   •  Multiple Vitamin Pulmonary/Chest: Effort normal and breath sounds normal.        Abdominal: Soft. Bowel sounds are normal. She exhibits no distension. There is no tenderness. Musculoskeletal: Normal range of motion. General: No deformity.      Neurological: She is definitive pathology.   The possibility of upstaging to in situ or invasive carcinoma was also discussed    At this time Noble Hoang would like to proceed with right breast lumpectomy with localization x2    Assessment   Papilloma of right breast  (primary encount

## 2020-02-05 ENCOUNTER — TELEPHONE (OUTPATIENT)
Dept: SURGERY | Facility: CLINIC | Age: 50
End: 2020-02-05

## 2020-02-05 DIAGNOSIS — D24.1 PAPILLOMA OF RIGHT BREAST: Primary | ICD-10-CM

## 2020-02-06 ENCOUNTER — APPOINTMENT (OUTPATIENT)
Dept: LAB | Age: 50
End: 2020-02-06
Payer: COMMERCIAL

## 2020-02-06 DIAGNOSIS — D24.1 PAPILLOMA OF RIGHT BREAST: ICD-10-CM

## 2020-02-06 LAB
ANION GAP SERPL CALC-SCNC: 6 MMOL/L (ref 0–18)
BUN BLD-MCNC: 8 MG/DL (ref 7–18)
BUN/CREAT SERPL: 9.2 (ref 10–20)
CALCIUM BLD-MCNC: 8.9 MG/DL (ref 8.5–10.1)
CHLORIDE SERPL-SCNC: 108 MMOL/L (ref 98–112)
CO2 SERPL-SCNC: 25 MMOL/L (ref 21–32)
CREAT BLD-MCNC: 0.87 MG/DL (ref 0.55–1.02)
GLUCOSE BLD-MCNC: 82 MG/DL (ref 70–99)
OSMOLALITY SERPL CALC.SUM OF ELEC: 285 MOSM/KG (ref 275–295)
PATIENT FASTING Y/N/NP: YES
POTASSIUM SERPL-SCNC: 4 MMOL/L (ref 3.5–5.1)
SODIUM SERPL-SCNC: 139 MMOL/L (ref 136–145)

## 2020-02-06 PROCEDURE — 36415 COLL VENOUS BLD VENIPUNCTURE: CPT

## 2020-02-06 PROCEDURE — 80048 BASIC METABOLIC PNL TOTAL CA: CPT

## 2020-02-07 ENCOUNTER — TELEPHONE (OUTPATIENT)
Dept: MAMMOGRAPHY | Age: 50
End: 2020-02-07

## 2020-02-07 ENCOUNTER — TELEPHONE (OUTPATIENT)
Dept: SURGERY | Facility: CLINIC | Age: 50
End: 2020-02-07

## 2020-02-07 DIAGNOSIS — D24.1 PAPILLOMA OF RIGHT BREAST: Primary | ICD-10-CM

## 2020-02-07 NOTE — TELEPHONE ENCOUNTER
I called the pt to discuss/review the WIRE LOC x2 procedure that is scheduled prior to her surgery on  Wednesday 2/12/20.  I reviewed how the pt will travel through the [employer], past the parking garage to the [facility] for the Radiologist to preform the [facility]

## 2020-02-12 ENCOUNTER — HOSPITAL ENCOUNTER (OUTPATIENT)
Facility: HOSPITAL | Age: 50
Setting detail: HOSPITAL OUTPATIENT SURGERY
Discharge: HOME | End: 2020-02-12
Attending: SURGERY | Admitting: SURGERY
Payer: COMMERCIAL

## 2020-02-12 ENCOUNTER — ANESTHESIA (OUTPATIENT)
Dept: SURGERY | Facility: HOSPITAL | Age: 50
End: 2020-02-12
Payer: COMMERCIAL

## 2020-02-12 ENCOUNTER — HOSPITAL ENCOUNTER (OUTPATIENT)
Dept: MAMMOGRAPHY | Facility: HOSPITAL | Age: 50
Discharge: HOME | End: 2020-02-12
Attending: SURGERY
Payer: COMMERCIAL

## 2020-02-12 ENCOUNTER — ANESTHESIA EVENT (OUTPATIENT)
Dept: SURGERY | Facility: HOSPITAL | Age: 50
End: 2020-02-12
Payer: COMMERCIAL

## 2020-02-12 VITALS
RESPIRATION RATE: 18 BRPM | SYSTOLIC BLOOD PRESSURE: 123 MMHG | OXYGEN SATURATION: 99 % | TEMPERATURE: 99 F | BODY MASS INDEX: 48.12 KG/M2 | HEART RATE: 90 BPM | WEIGHT: 288.81 LBS | HEIGHT: 65 IN | DIASTOLIC BLOOD PRESSURE: 72 MMHG

## 2020-02-12 DIAGNOSIS — D24.1 PAPILLOMA OF RIGHT BREAST: ICD-10-CM

## 2020-02-12 DIAGNOSIS — D24.1 PAPILLOMA OF RIGHT BREAST: Primary | ICD-10-CM

## 2020-02-12 LAB
POCT LOT NUMBER: NORMAL
POCT URINE PREGNANCY: NEGATIVE

## 2020-02-12 PROCEDURE — 88307 TISSUE EXAM BY PATHOLOGIST: CPT | Performed by: SURGERY

## 2020-02-12 PROCEDURE — 76098 X-RAY EXAM SURGICAL SPECIMEN: CPT | Performed by: SURGERY

## 2020-02-12 PROCEDURE — 19281 PERQ DEVICE BREAST 1ST IMAG: CPT | Performed by: SURGERY

## 2020-02-12 PROCEDURE — 81025 URINE PREGNANCY TEST: CPT | Performed by: SURGERY

## 2020-02-12 PROCEDURE — 0HBT0ZZ EXCISION OF RIGHT BREAST, OPEN APPROACH: ICD-10-PCS | Performed by: SURGERY

## 2020-02-12 PROCEDURE — 19282 PERQ DEVICE BREAST EA IMAG: CPT | Performed by: SURGERY

## 2020-02-12 RX ORDER — HEPARIN SODIUM 5000 [USP'U]/ML
5000 INJECTION, SOLUTION INTRAVENOUS; SUBCUTANEOUS ONCE
Status: COMPLETED | OUTPATIENT
Start: 2020-02-12 | End: 2020-02-12

## 2020-02-12 RX ORDER — SODIUM CHLORIDE, SODIUM LACTATE, POTASSIUM CHLORIDE, CALCIUM CHLORIDE 600; 310; 30; 20 MG/100ML; MG/100ML; MG/100ML; MG/100ML
INJECTION, SOLUTION INTRAVENOUS CONTINUOUS
Status: DISCONTINUED | OUTPATIENT
Start: 2020-02-12 | End: 2020-02-12

## 2020-02-12 RX ORDER — HYDROMORPHONE HYDROCHLORIDE 1 MG/ML
0.4 INJECTION, SOLUTION INTRAMUSCULAR; INTRAVENOUS; SUBCUTANEOUS EVERY 5 MIN PRN
Status: DISCONTINUED | OUTPATIENT
Start: 2020-02-12 | End: 2020-02-12

## 2020-02-12 RX ORDER — LIDOCAINE HYDROCHLORIDE 10 MG/ML
INJECTION, SOLUTION INFILTRATION; PERINEURAL AS NEEDED
Status: DISCONTINUED | OUTPATIENT
Start: 2020-02-12 | End: 2020-02-12 | Stop reason: HOSPADM

## 2020-02-12 RX ORDER — DIAZEPAM 5 MG/1
5 TABLET ORAL AS NEEDED
Status: DISCONTINUED | OUTPATIENT
Start: 2020-02-12 | End: 2020-02-12 | Stop reason: HOSPADM

## 2020-02-12 RX ORDER — ONDANSETRON 2 MG/ML
4 INJECTION INTRAMUSCULAR; INTRAVENOUS AS NEEDED
Status: DISCONTINUED | OUTPATIENT
Start: 2020-02-12 | End: 2020-02-12

## 2020-02-12 RX ORDER — ONDANSETRON 2 MG/ML
INJECTION INTRAMUSCULAR; INTRAVENOUS AS NEEDED
Status: DISCONTINUED | OUTPATIENT
Start: 2020-02-12 | End: 2020-02-12 | Stop reason: SURG

## 2020-02-12 RX ORDER — BUPIVACAINE HYDROCHLORIDE AND EPINEPHRINE 5; 5 MG/ML; UG/ML
INJECTION, SOLUTION EPIDURAL; INTRACAUDAL; PERINEURAL AS NEEDED
Status: DISCONTINUED | OUTPATIENT
Start: 2020-02-12 | End: 2020-02-12 | Stop reason: HOSPADM

## 2020-02-12 RX ORDER — DEXAMETHASONE SODIUM PHOSPHATE 4 MG/ML
VIAL (ML) INJECTION AS NEEDED
Status: DISCONTINUED | OUTPATIENT
Start: 2020-02-12 | End: 2020-02-12 | Stop reason: SURG

## 2020-02-12 RX ORDER — SCOLOPAMINE TRANSDERMAL SYSTEM 1 MG/1
PATCH, EXTENDED RELEASE TRANSDERMAL AS NEEDED
Status: DISCONTINUED | OUTPATIENT
Start: 2020-02-12 | End: 2020-02-12 | Stop reason: SURG

## 2020-02-12 RX ORDER — DEXTROSE MONOHYDRATE 25 G/50ML
50 INJECTION, SOLUTION INTRAVENOUS
Status: DISCONTINUED | OUTPATIENT
Start: 2020-02-12 | End: 2020-02-12

## 2020-02-12 RX ORDER — HYDROCODONE BITARTRATE AND ACETAMINOPHEN 5; 325 MG/1; MG/1
1 TABLET ORAL AS NEEDED
Status: COMPLETED | OUTPATIENT
Start: 2020-02-12 | End: 2020-02-12

## 2020-02-12 RX ORDER — ACETAMINOPHEN 500 MG
1000 TABLET ORAL ONCE
COMMUNITY
End: 2021-12-20 | Stop reason: ALTCHOICE

## 2020-02-12 RX ORDER — ACETAMINOPHEN 500 MG
1000 TABLET ORAL ONCE
Status: DISCONTINUED | OUTPATIENT
Start: 2020-02-12 | End: 2020-02-12 | Stop reason: HOSPADM

## 2020-02-12 RX ORDER — HYDROCODONE BITARTRATE AND ACETAMINOPHEN 5; 325 MG/1; MG/1
1 TABLET ORAL EVERY 6 HOURS PRN
Qty: 10 TABLET | Refills: 0 | Status: SHIPPED | OUTPATIENT
Start: 2020-02-12 | End: 2020-02-20

## 2020-02-12 RX ORDER — HYDROCODONE BITARTRATE AND ACETAMINOPHEN 5; 325 MG/1; MG/1
2 TABLET ORAL AS NEEDED
Status: COMPLETED | OUTPATIENT
Start: 2020-02-12 | End: 2020-02-12

## 2020-02-12 RX ORDER — MIDAZOLAM HYDROCHLORIDE 1 MG/ML
INJECTION INTRAMUSCULAR; INTRAVENOUS AS NEEDED
Status: DISCONTINUED | OUTPATIENT
Start: 2020-02-12 | End: 2020-02-12 | Stop reason: SURG

## 2020-02-12 RX ORDER — NALOXONE HYDROCHLORIDE 0.4 MG/ML
80 INJECTION, SOLUTION INTRAMUSCULAR; INTRAVENOUS; SUBCUTANEOUS AS NEEDED
Status: DISCONTINUED | OUTPATIENT
Start: 2020-02-12 | End: 2020-02-12

## 2020-02-12 RX ADMIN — LIDOCAINE HYDROCHLORIDE 50 MG: 10 INJECTION, SOLUTION INFILTRATION; PERINEURAL at 12:37:00

## 2020-02-12 RX ADMIN — SCOLOPAMINE TRANSDERMAL SYSTEM 1 PATCH: 1 PATCH, EXTENDED RELEASE TRANSDERMAL at 12:43:00

## 2020-02-12 RX ADMIN — DEXAMETHASONE SODIUM PHOSPHATE 8 MG: 4 MG/ML VIAL (ML) INJECTION at 12:43:00

## 2020-02-12 RX ADMIN — ONDANSETRON 4 MG: 2 INJECTION INTRAMUSCULAR; INTRAVENOUS at 13:24:00

## 2020-02-12 RX ADMIN — MIDAZOLAM HYDROCHLORIDE 2 MG: 1 INJECTION INTRAMUSCULAR; INTRAVENOUS at 12:34:00

## 2020-02-12 NOTE — ANESTHESIA PROCEDURE NOTES
Airway  Date/Time: 2/12/2020 12:38 PM  Urgency: elective      General Information and Staff    Patient location during procedure: OR  Anesthesiologist: Michelle Pelayo MD  Performed: anesthesiologist     Indications and Patient Condition  Indications

## 2020-02-12 NOTE — ANESTHESIA POSTPROCEDURE EVALUATION
[place] [address] Patient Status:  Hospital Outpatient Surgery   Age/Gender 51year old female MRN [id number]   Location [address] [facility] Attending Zheng Ralph MD   Hosp Day # 0 PCP Yahaira Mendez MD

## 2020-02-12 NOTE — INTERVAL H&P NOTE
Pre-op Diagnosis: Papilloma of right breast [D24.1]    The above referenced H&P was reviewed by Bj Carvajal MD on 2/12/2020, the patient was examined and no significant changes have occurred in the patient's condition since the H&P was performed.   I di

## 2020-02-12 NOTE — BRIEF OP NOTE
Pre-Operative Diagnosis: Papilloma of right breast times 2      Post-Operative Diagnosis: same     Procedure Performed:   Procedure(s):  RIGHT BREAST LUMPECTOMY WITH XRAY LOCALIZATION TIMES TWO    Surgeon(s) and Role:     Gloria Calle MD - Primary

## 2020-02-12 NOTE — ANESTHESIA PREPROCEDURE EVALUATION
PRE-OP EVALUATION    Patient Name: Jemal Redd    Pre-op Diagnosis: Papilloma of right breast [D24.1]    Procedure(s):  RIGHT BREAST LUMPECTOMY WITH XRAY LOCALIZATION TIMES TWO    Surgeon(s) and Role:     Mragie Pelayo MD - Primary    Pre-op Endo/Other           (+) hypothyroidism                       Pulmonary    Negative pulmonary ROS.                        Neuro/Psych    Negative neuro/psych ROS.  (+) depression             (+) headaches                 Past Surgical History:   Procedure L

## 2020-02-12 NOTE — OPERATIVE REPORT
[facility]   Op Note    Chay Crease Location: OR   CSN [id number] MRN [id number]   Admission Date 2/12/2020 Operation Date 2/12/2020   Attending Physician Juma Machado MD Operating Physician Scott Parson MD       Date of procedure:    2- areolar complex, possible need for further surgery or other treatment pending final pathology and/or margin status.  These and other potential intraoperative and adarsh operative  risks were discussed with the patient and they do not have any further question

## 2020-02-20 ENCOUNTER — OFFICE VISIT (OUTPATIENT)
Dept: SURGERY | Facility: CLINIC | Age: 50
End: 2020-02-20

## 2020-02-20 ENCOUNTER — MED REC SCAN ONLY (OUTPATIENT)
Dept: SURGERY | Facility: CLINIC | Age: 50
End: 2020-02-20

## 2020-02-20 VITALS — WEIGHT: 288 LBS | TEMPERATURE: 98 F | BODY MASS INDEX: 48 KG/M2

## 2020-02-20 DIAGNOSIS — N60.99 ATYPICAL DUCTAL HYPERPLASIA OF BREAST: Primary | ICD-10-CM

## 2020-02-20 DIAGNOSIS — Z80.3 FAMILY HISTORY OF BREAST CANCER: ICD-10-CM

## 2020-02-20 DIAGNOSIS — D24.1 PAPILLOMA OF RIGHT BREAST: ICD-10-CM

## 2020-02-20 DIAGNOSIS — Z98.890 S/P RIGHT BREAST BIOPSY: ICD-10-CM

## 2020-02-20 PROCEDURE — 99024 POSTOP FOLLOW-UP VISIT: CPT | Performed by: SURGERY

## 2020-02-21 NOTE — PROGRESS NOTES
Follow Up Visit Note       Active Problems      1. Atypical ductal hyperplasia of breast    2. S/P right breast biopsy    3. Family history of breast cancer    4.  Papilloma of right breast          Chief Complaint   Patient presents with:  Post-Op: RIGHT B (90 Base) MCG/ACT Inhalation Aero Soln, as needed. , Disp: , Rfl:   •  Acidophilus/Pectin Oral Cap, Take 1 capsule by mouth daily. , Disp: , Rfl:   •  Multiple Vitamin (DAILY ELLIE) Oral Tab, Take 1 tablet by mouth daily. , Disp: , Rfl:   •  Nutritional Supp resumed their preoperative medications.     Oncology consultation to discuss chemoprevention was discussed and contact information was given to the patient at the time of this visit      Plan     The patient will follow up with a routine mammogram in 6 morris

## 2020-03-01 NOTE — PROGRESS NOTES
Cancer Center Report of Consultation    Patient Name: Diana Mccoy   YOB: 1970   Medical Record Number: [id number]   CSN: [id number]   Consulting Physician: Bertha Allen MD  Referring Physician(s): No ref.  provider found  Date of C Psychosocial History:  Social History    Socioeconomic History      Marital status:       Spouse name: Not on file      Number of children: Not on file      Years of education: Not on file      Highest education level: Not on file    Occupationa Take 1,000 mg by mouth once., Disp: , Rfl:   •  NON FORMULARY, Collagen 2 scoops in coffee po daily, Disp: , Rfl:   •  Magnesium 100 MG Oral Tab, Take by mouth., Disp: , Rfl:   •  Albuterol Sulfate HFA (PROAIR HFA) 108 (90 Base) MCG/ACT Inhalation Aero Sol intact.      Pathology:    Final Diagnosis:   Right breast needle localization lumpectomy:  -Atypical ductal hyperplasia (ADH) and flat epithelial atypia (FEA), focal.   -Intraductal papillomas associated with previous biopsy site changes.   -Background harry

## 2020-03-02 ENCOUNTER — OFFICE VISIT (OUTPATIENT)
Dept: HEMATOLOGY/ONCOLOGY | Age: 50
End: 2020-03-02
Attending: INTERNAL MEDICINE
Payer: COMMERCIAL

## 2020-03-02 VITALS
HEART RATE: 94 BPM | WEIGHT: 293 LBS | TEMPERATURE: 99 F | SYSTOLIC BLOOD PRESSURE: 140 MMHG | OXYGEN SATURATION: 97 % | BODY MASS INDEX: 49 KG/M2 | RESPIRATION RATE: 18 BRPM | DIASTOLIC BLOOD PRESSURE: 87 MMHG

## 2020-03-02 DIAGNOSIS — N60.91 ATYPICAL DUCTAL HYPERPLASIA OF RIGHT BREAST: Primary | ICD-10-CM

## 2020-03-02 PROCEDURE — 99244 OFF/OP CNSLTJ NEW/EST MOD 40: CPT | Performed by: INTERNAL MEDICINE

## 2020-03-02 RX ORDER — IBUPROFEN 200 MG
200 TABLET ORAL EVERY 6 HOURS PRN
COMMUNITY

## 2020-03-02 NOTE — PROGRESS NOTES
Pt here with spouse for MD consultation. Pt had right breast lumpectomy on 2/12- states she is healing well. Occasional right breast discomfort. Takes Tyl or motrin PRN. Tamoxifen handout given to pt along with office contact number.

## 2020-03-02 NOTE — CONSULTS
[facility]cer Center Report of Consultation    Patient Name: Scott Us   YOB: 1970   Medical Record Number: [id number]   CSN: [id number]   Consulting Physician: Mimi Swartz MD  Referring Physician(s): Zully Nunes MD  Date of Cons T1    L2    SAB0  TAB0  Ectopic0  Multiple0  Live Births2    Menarche at 6, first live birth at 46. IVF x 2. Psychosocial History:    .  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   Works in  in [place] --  Weight: 133.2 kg (293 lb 9.6 oz) (03/02 1414)  BSA (Calculated - sq m): --  Pulse: 94 (03/02 1414)  BP: 140/87 (03/02 1414)  Temp: 99 °F (37.2 °C) (03/02 1414)  Do Not Use - Resp Rate: --  SpO2: 97 % (03/02 1414)      ECOG PS: 0    Physical Examination include, but are not limited to, the increased risk of endometrial carcinoma and thromboembolic events. At the end of the visit, patient was not sure if she wishes to pursue chemoprevention. We gave her a handout and she will call with her decision.

## 2020-03-02 NOTE — PATIENT INSTRUCTIONS
For Dr. Luis You nurse line, call [phone] with any questions or concerns,  Monday through Friday 8:00 to 4:30, After hours or weekends for urgent needs.

## 2020-03-17 ENCOUNTER — OFFICE VISIT (OUTPATIENT)
Dept: SURGERY | Facility: CLINIC | Age: 50
End: 2020-03-17

## 2020-03-17 VITALS
TEMPERATURE: 98 F | SYSTOLIC BLOOD PRESSURE: 139 MMHG | RESPIRATION RATE: 16 BRPM | BODY MASS INDEX: 47.98 KG/M2 | DIASTOLIC BLOOD PRESSURE: 90 MMHG | HEART RATE: 84 BPM | WEIGHT: 288 LBS | HEIGHT: 65 IN

## 2020-03-17 DIAGNOSIS — Z80.3 FAMILY HISTORY OF BREAST CANCER: ICD-10-CM

## 2020-03-17 DIAGNOSIS — D24.1 PAPILLOMA OF RIGHT BREAST: ICD-10-CM

## 2020-03-17 DIAGNOSIS — E66.01 CLASS 3 SEVERE OBESITY WITH BODY MASS INDEX (BMI) OF 45.0 TO 49.9 IN ADULT, UNSPECIFIED OBESITY TYPE, UNSPECIFIED WHETHER SERIOUS COMORBIDITY PRESENT (HCC): ICD-10-CM

## 2020-03-17 DIAGNOSIS — N60.99 ATYPICAL DUCTAL HYPERPLASIA OF BREAST: Primary | ICD-10-CM

## 2020-03-17 DIAGNOSIS — Z98.890 H/O BREAST BIOPSY: ICD-10-CM

## 2020-03-17 PROCEDURE — 99024 POSTOP FOLLOW-UP VISIT: CPT | Performed by: SURGERY

## 2020-04-29 NOTE — PROGRESS NOTES
Follow Up Visit Note       Active Problems      1. Atypical ductal hyperplasia of breast    2. Papilloma of right breast    3. Family history of breast cancer    4.  Class 3 severe obesity with body mass index (BMI) of 45.0 to 49.9 in adult, unspecified obe 1 tablet (50 mg total) by mouth daily. , Disp: 90 tablet, Rfl: 0  •  amLODIPine Besylate 5 MG Oral Tab, Take 1 tablet (5 mg total) by mouth daily. , Disp: 90 tablet, Rfl: 0  •  acetaminophen 500 MG Oral Tab, Take 1,000 mg by mouth once., Disp: , Rfl:   •  NO 12, 2020  Both sites were localized and able to be incorporated into a single incision     The final pathology was consistent with ADH, FEA, intraductal Pap dominance. There was no evidence of in situ or invasive carcinoma.   Maxx Diaz is here today because she

## 2020-08-27 ENCOUNTER — OFFICE VISIT (OUTPATIENT)
Dept: INTERNAL MEDICINE CLINIC | Facility: CLINIC | Age: 50
End: 2020-08-27
Payer: COMMERCIAL

## 2020-08-27 VITALS
SYSTOLIC BLOOD PRESSURE: 148 MMHG | DIASTOLIC BLOOD PRESSURE: 94 MMHG | WEIGHT: 293 LBS | TEMPERATURE: 99 F | RESPIRATION RATE: 16 BRPM | HEIGHT: 65 IN | OXYGEN SATURATION: 98 % | HEART RATE: 82 BPM | BODY MASS INDEX: 48.82 KG/M2

## 2020-08-27 DIAGNOSIS — Z13.220 LIPID SCREENING: ICD-10-CM

## 2020-08-27 DIAGNOSIS — Z13.29 THYROID DISORDER SCREEN: ICD-10-CM

## 2020-08-27 DIAGNOSIS — Z12.4 SCREENING FOR CERVICAL CANCER: ICD-10-CM

## 2020-08-27 DIAGNOSIS — Z13.89 SCREENING FOR GENITOURINARY CONDITION: ICD-10-CM

## 2020-08-27 DIAGNOSIS — Z13.0 SCREENING, IRON DEFICIENCY ANEMIA: ICD-10-CM

## 2020-08-27 DIAGNOSIS — I10 ESSENTIAL HYPERTENSION: ICD-10-CM

## 2020-08-27 DIAGNOSIS — Z00.00 LABORATORY EXAMINATION ORDERED AS PART OF A ROUTINE GENERAL MEDICAL EXAMINATION: ICD-10-CM

## 2020-08-27 DIAGNOSIS — Z12.31 ENCOUNTER FOR SCREENING MAMMOGRAM FOR MALIGNANT NEOPLASM OF BREAST: ICD-10-CM

## 2020-08-27 DIAGNOSIS — Z00.00 ANNUAL PHYSICAL EXAM: Primary | ICD-10-CM

## 2020-08-27 PROBLEM — N60.91 ATYPICAL DUCTAL HYPERPLASIA OF RIGHT BREAST: Status: ACTIVE | Noted: 2020-08-27

## 2020-08-27 PROCEDURE — 3080F DIAST BP >= 90 MM HG: CPT | Performed by: INTERNAL MEDICINE

## 2020-08-27 PROCEDURE — 3077F SYST BP >= 140 MM HG: CPT | Performed by: INTERNAL MEDICINE

## 2020-08-27 PROCEDURE — 87624 HPV HI-RISK TYP POOLED RSLT: CPT | Performed by: INTERNAL MEDICINE

## 2020-08-27 PROCEDURE — 3008F BODY MASS INDEX DOCD: CPT | Performed by: INTERNAL MEDICINE

## 2020-08-27 PROCEDURE — 99396 PREV VISIT EST AGE 40-64: CPT | Performed by: INTERNAL MEDICINE

## 2020-08-27 RX ORDER — LOSARTAN POTASSIUM 50 MG/1
50 TABLET ORAL DAILY
Qty: 90 TABLET | Refills: 0 | Status: SHIPPED | OUTPATIENT
Start: 2020-08-27 | End: 2021-02-16

## 2020-08-27 RX ORDER — AMLODIPINE BESYLATE 5 MG/1
5 TABLET ORAL DAILY
Qty: 90 TABLET | Refills: 0 | Status: SHIPPED | OUTPATIENT
Start: 2020-08-27 | End: 2021-02-16

## 2020-08-27 NOTE — PROGRESS NOTES
HPI:    Patient ID: Jen Blue is a 53year old female. HPI  HPI:   Jen Blue is a 53year old female who presents for a complete physical exam. Symptoms: denies discharge, itching, burning or dysuria, periods are regular.  Patient c mg by mouth once. • NON FORMULARY Collagen 2 scoops in coffee po daily     • Magnesium 100 MG Oral Tab Take by mouth. • Albuterol Sulfate HFA (PROAIR HFA) 108 (90 Base) MCG/ACT Inhalation Aero Soln as needed.        • Acidophilus/Pectin Oral Cap [facility] breath with exertion  CARDIOVASCULAR: denies chest pain on exertion  GI: denies abdominal pain,denies heartburn  : denies dysuria, vaginal discharge or itching,periods regular   MUSCULOSKELETAL: denies back pain  NEURO: denies headaches  PSYCHE: denies d MG Oral Tab Take 1 tablet (50 mg total) by mouth daily. 90 tablet 0   • amLODIPine Besylate 5 MG Oral Tab Take 1 tablet (5 mg total) by mouth daily. 90 tablet 0   • Omega-3 Fatty Acids (OMEGA-3 OR) Take 1 tablet by mouth nightly.      • ibuprofen 200 MG Ora BILAT ([id number])       [id number]

## 2020-08-27 NOTE — PATIENT INSTRUCTIONS
Prevention Guidelines, Women Ages 36 to 52  Screening tests and vaccines are an important part of managing your health. A screening test is done to find diseases in people who don't have any symptoms.  The goal is to find a disease early so lifestyle hernandez · Flexible sigmoidoscopy every 5 years, or  · Colonoscopy every 10 years, or  · CT colonography (virtual colonoscopy) every 5 years, or  · Yearly fecal occult blood test, or  · Yearly fecal immunochemical test every year, or  · Stool DNA test, every 3 year Chickenpox (varicella) All women in this age group who have no record of this infection or vaccine 2 doses; the second dose should be given at least 4 weeks after the first dose   Hepatitis A Women at increased risk for infection–talk with your healthcare Use of tobacco and the health effects it can cause All women in this age group Every exam   1 American Diabetes Association  2 American College of Obstetricians and Gynecologists   [address]  [address] [address]  [place]

## 2020-08-28 ENCOUNTER — TELEPHONE (OUTPATIENT)
Dept: INTERNAL MEDICINE CLINIC | Facility: CLINIC | Age: 50
End: 2020-08-28

## 2020-08-28 DIAGNOSIS — Z00.00 ANNUAL PHYSICAL EXAM: Primary | ICD-10-CM

## 2020-08-28 LAB — HPV I/H RISK 1 DNA SPEC QL NAA+PROBE: NEGATIVE

## 2020-08-28 PROCEDURE — 88175 CYTOPATH C/V AUTO FLUID REDO: CPT | Performed by: INTERNAL MEDICINE

## 2020-09-10 ENCOUNTER — OFFICE VISIT (OUTPATIENT)
Dept: INTERNAL MEDICINE CLINIC | Facility: CLINIC | Age: 50
End: 2020-09-10
Payer: COMMERCIAL

## 2020-09-10 VITALS
OXYGEN SATURATION: 98 % | RESPIRATION RATE: 16 BRPM | TEMPERATURE: 98 F | HEART RATE: 102 BPM | DIASTOLIC BLOOD PRESSURE: 85 MMHG | SYSTOLIC BLOOD PRESSURE: 130 MMHG | HEIGHT: 65 IN | BODY MASS INDEX: 48.82 KG/M2 | WEIGHT: 293 LBS

## 2020-09-10 DIAGNOSIS — I10 ESSENTIAL HYPERTENSION: Primary | ICD-10-CM

## 2020-09-10 DIAGNOSIS — Z23 NEED FOR INFLUENZA VACCINATION: ICD-10-CM

## 2020-09-10 PROCEDURE — 90471 IMMUNIZATION ADMIN: CPT | Performed by: INTERNAL MEDICINE

## 2020-09-10 PROCEDURE — 90686 IIV4 VACC NO PRSV 0.5 ML IM: CPT | Performed by: INTERNAL MEDICINE

## 2020-09-10 PROCEDURE — 3008F BODY MASS INDEX DOCD: CPT | Performed by: INTERNAL MEDICINE

## 2020-09-10 PROCEDURE — 99213 OFFICE O/P EST LOW 20 MIN: CPT | Performed by: INTERNAL MEDICINE

## 2020-09-10 PROCEDURE — 3079F DIAST BP 80-89 MM HG: CPT | Performed by: INTERNAL MEDICINE

## 2020-09-10 PROCEDURE — 3075F SYST BP GE 130 - 139MM HG: CPT | Performed by: INTERNAL MEDICINE

## 2020-09-10 NOTE — PROGRESS NOTES
HPI:    Patient ID: Chay Kinney is a 53year old female. HTN       Chay Kinney is a 53year old female. HPI:   Patient presents for recheck of her hypertension.  Pt has been taking medications as instructed, no medication side effec daily.     • Multiple Vitamin (DAILY ELLIE) Oral Tab Take 1 tablet by mouth daily. • Nutritional Supplements (EAA SUPPLEMENT OR) Take by mouth.         Past Medical History:   Diagnosis Date   • Essential tremor     bilateral hands; genetic per patient presents for a recheck of her hypertension. BP is well controlled, no significant medication side effects noted. PLAN: will continue present medications, reviewed diet, exercise and weight control.  The patient indicates understanding of these issues and a

## 2020-09-15 ENCOUNTER — LAB ENCOUNTER (OUTPATIENT)
Dept: LAB | Age: 50
End: 2020-09-15
Attending: INTERNAL MEDICINE
Payer: COMMERCIAL

## 2020-09-15 DIAGNOSIS — Z00.00 LABORATORY EXAMINATION ORDERED AS PART OF A ROUTINE GENERAL MEDICAL EXAMINATION: ICD-10-CM

## 2020-09-15 DIAGNOSIS — Z13.89 SCREENING FOR GENITOURINARY CONDITION: ICD-10-CM

## 2020-09-15 DIAGNOSIS — Z13.0 SCREENING, IRON DEFICIENCY ANEMIA: ICD-10-CM

## 2020-09-15 DIAGNOSIS — Z13.29 THYROID DISORDER SCREEN: ICD-10-CM

## 2020-09-15 DIAGNOSIS — Z13.220 LIPID SCREENING: ICD-10-CM

## 2020-09-15 LAB
ALBUMIN SERPL-MCNC: 3.4 G/DL (ref 3.4–5)
ALBUMIN/GLOB SERPL: 0.9 {RATIO} (ref 1–2)
ALP LIVER SERPL-CCNC: 57 U/L (ref 39–100)
ALT SERPL-CCNC: 26 U/L (ref 13–56)
ANION GAP SERPL CALC-SCNC: 7 MMOL/L (ref 0–18)
AST SERPL-CCNC: 13 U/L (ref 15–37)
BASOPHILS # BLD AUTO: 0.04 X10(3) UL (ref 0–0.2)
BASOPHILS NFR BLD AUTO: 0.5 %
BILIRUB SERPL-MCNC: 0.4 MG/DL (ref 0.1–2)
BILIRUB UR QL STRIP.AUTO: NEGATIVE
BUN BLD-MCNC: 12 MG/DL (ref 7–18)
BUN/CREAT SERPL: 13.2 (ref 10–20)
CALCIUM BLD-MCNC: 9 MG/DL (ref 8.5–10.1)
CHLORIDE SERPL-SCNC: 105 MMOL/L (ref 98–112)
CHOLEST SMN-MCNC: 177 MG/DL (ref ?–200)
CO2 SERPL-SCNC: 26 MMOL/L (ref 21–32)
COLOR UR AUTO: YELLOW
CREAT BLD-MCNC: 0.91 MG/DL (ref 0.55–1.02)
DEPRECATED RDW RBC AUTO: 45.4 FL (ref 35.1–46.3)
EOSINOPHIL # BLD AUTO: 0.38 X10(3) UL (ref 0–0.7)
EOSINOPHIL NFR BLD AUTO: 4.8 %
ERYTHROCYTE [DISTWIDTH] IN BLOOD BY AUTOMATED COUNT: 18.1 % (ref 11–15)
EST. AVERAGE GLUCOSE BLD GHB EST-MCNC: 123 MG/DL (ref 68–126)
GLOBULIN PLAS-MCNC: 3.6 G/DL (ref 2.8–4.4)
GLUCOSE BLD-MCNC: 89 MG/DL (ref 70–99)
GLUCOSE UR STRIP.AUTO-MCNC: NEGATIVE MG/DL
HBA1C MFR BLD HPLC: 5.9 % (ref ?–5.7)
HCT VFR BLD AUTO: 38.2 % (ref 35–48)
HDLC SERPL-MCNC: 55 MG/DL (ref 40–59)
HGB BLD-MCNC: 11.4 G/DL (ref 12–16)
IMM GRANULOCYTES # BLD AUTO: 0.02 X10(3) UL (ref 0–1)
IMM GRANULOCYTES NFR BLD: 0.3 %
KETONES UR STRIP.AUTO-MCNC: NEGATIVE MG/DL
LDLC SERPL CALC-MCNC: 78 MG/DL (ref ?–100)
LYMPHOCYTES # BLD AUTO: 2.45 X10(3) UL (ref 1–4)
LYMPHOCYTES NFR BLD AUTO: 31.1 %
M PROTEIN MFR SERPL ELPH: 7 G/DL (ref 6.4–8.2)
MCH RBC QN AUTO: 21.9 PG (ref 26–34)
MCHC RBC AUTO-ENTMCNC: 29.8 G/DL (ref 31–37)
MCV RBC AUTO: 73.5 FL (ref 80–100)
MONOCYTES # BLD AUTO: 0.52 X10(3) UL (ref 0.1–1)
MONOCYTES NFR BLD AUTO: 6.6 %
NEUTROPHILS # BLD AUTO: 4.46 X10 (3) UL (ref 1.5–7.7)
NEUTROPHILS # BLD AUTO: 4.46 X10(3) UL (ref 1.5–7.7)
NEUTROPHILS NFR BLD AUTO: 56.7 %
NITRITE UR QL STRIP.AUTO: NEGATIVE
NONHDLC SERPL-MCNC: 122 MG/DL (ref ?–130)
OSMOLALITY SERPL CALC.SUM OF ELEC: 285 MOSM/KG (ref 275–295)
PATIENT FASTING Y/N/NP: YES
PATIENT FASTING Y/N/NP: YES
PH UR STRIP.AUTO: 7 [PH] (ref 4.5–8)
PLATELET # BLD AUTO: 385 10(3)UL (ref 150–450)
POTASSIUM SERPL-SCNC: 3.9 MMOL/L (ref 3.5–5.1)
PROT UR STRIP.AUTO-MCNC: NEGATIVE MG/DL
RBC # BLD AUTO: 5.2 X10(6)UL (ref 3.8–5.3)
RBC UR QL AUTO: NEGATIVE
SODIUM SERPL-SCNC: 138 MMOL/L (ref 136–145)
SP GR UR STRIP.AUTO: 1.02 (ref 1–1.03)
TRIGL SERPL-MCNC: 218 MG/DL (ref 30–149)
TSI SER-ACNC: 3.35 MIU/ML (ref 0.36–3.74)
UROBILINOGEN UR STRIP.AUTO-MCNC: <2 MG/DL
VLDLC SERPL CALC-MCNC: 44 MG/DL (ref 0–30)
WBC # BLD AUTO: 7.9 X10(3) UL (ref 4–11)

## 2020-09-15 PROCEDURE — 85025 COMPLETE CBC W/AUTO DIFF WBC: CPT

## 2020-09-15 PROCEDURE — 36415 COLL VENOUS BLD VENIPUNCTURE: CPT

## 2020-09-15 PROCEDURE — 83036 HEMOGLOBIN GLYCOSYLATED A1C: CPT

## 2020-09-15 PROCEDURE — 80061 LIPID PANEL: CPT

## 2020-09-15 PROCEDURE — 80053 COMPREHEN METABOLIC PANEL: CPT

## 2020-09-15 PROCEDURE — 84443 ASSAY THYROID STIM HORMONE: CPT

## 2020-09-15 PROCEDURE — 81001 URINALYSIS AUTO W/SCOPE: CPT

## 2020-09-25 ENCOUNTER — HOSPITAL ENCOUNTER (OUTPATIENT)
Dept: MAMMOGRAPHY | Age: 50
Discharge: HOME | End: 2020-09-25
Attending: SURGERY
Payer: COMMERCIAL

## 2020-09-25 DIAGNOSIS — N60.99 ATYPICAL DUCTAL HYPERPLASIA OF BREAST: ICD-10-CM

## 2020-09-25 PROCEDURE — 77062 BREAST TOMOSYNTHESIS BI: CPT | Performed by: SURGERY

## 2020-09-25 PROCEDURE — 77066 DX MAMMO INCL CAD BI: CPT | Performed by: SURGERY

## 2020-10-22 DIAGNOSIS — N60.99 BENIGN MAMMARY DYSPLASIA, UNSPECIFIED LATERALITY: Primary | ICD-10-CM

## 2020-10-29 ENCOUNTER — TELEPHONE (OUTPATIENT)
Dept: SURGERY | Facility: CLINIC | Age: 50
End: 2020-10-29

## 2020-10-29 NOTE — TELEPHONE ENCOUNTER
Called [facility] and spoke with Stacy Ferreira for prior authorization for MBI CPT 55389. States this was not managed by this department and to contact health plan. Called patients health plan. Spoke with Aneudy Khanna. No prior authorization required.  Will send [address] msg to

## 2020-12-28 ENCOUNTER — HOSPITAL ENCOUNTER (OUTPATIENT)
Dept: NUCLEAR MEDICINE | Facility: HOSPITAL | Age: 50
Discharge: HOME | End: 2020-12-28
Attending: SURGERY
Payer: COMMERCIAL

## 2020-12-28 DIAGNOSIS — N60.99 BENIGN MAMMARY DYSPLASIA, UNSPECIFIED LATERALITY: ICD-10-CM

## 2020-12-28 PROCEDURE — 78800 RP LOCLZJ TUM 1 AREA 1 D IMG: CPT | Performed by: SURGERY

## 2021-01-01 ENCOUNTER — PATIENT MESSAGE (OUTPATIENT)
Dept: INTERNAL MEDICINE CLINIC | Facility: CLINIC | Age: 51
End: 2021-01-01

## 2021-01-01 DIAGNOSIS — F41.9 ANXIETY: ICD-10-CM

## 2021-01-02 RX ORDER — DIAZEPAM 5 MG/1
TABLET ORAL
Qty: 5 TABLET | Refills: 0 | Status: SHIPPED | OUTPATIENT
Start: 2021-01-02 | End: 2021-09-24 | Stop reason: ALTCHOICE

## 2021-01-02 NOTE — TELEPHONE ENCOUNTER
From: Scott Us  To: Yahaira Mendez MD  Sent: 1/1/2021 11:31 AM CST  Subject: Prescription Question    Hi,  I am writing to request you prescribe me Valium for the breast MRI I have scheduled for Tuesday January 5th at the [facility]

## 2021-01-05 ENCOUNTER — HOSPITAL ENCOUNTER (OUTPATIENT)
Dept: MRI IMAGING | Facility: HOSPITAL | Age: 51
Discharge: HOME | End: 2021-01-05
Attending: SURGERY
Payer: COMMERCIAL

## 2021-01-05 DIAGNOSIS — R92.8 ABNORMAL FINDING ON BREAST IMAGING: ICD-10-CM

## 2021-01-05 PROCEDURE — 77049 MRI BREAST C-+ W/CAD BI: CPT | Performed by: SURGERY

## 2021-01-05 PROCEDURE — A9575 INJ GADOTERATE MEGLUMI 0.1ML: HCPCS | Performed by: SURGERY

## 2021-01-07 NOTE — IMAGING NOTE
1404:  Spoke with RN Ignacia Marquez in Dr. Roxane Bernheim office. MRI results and Dr. Kulwant Killian recommendation discussed. Ignacia Marquez reported that she would follow-up with Dr. Allie Jin and Ms. Fred Cary.

## 2021-01-07 NOTE — IMAGING NOTE
Parth Harris  is recommended for an MRI guided biopsy of the Right Breast by .     History obtained:  INDICATIONS:  R92.8 Abnormal finding on breast imaging    TECHNIQUE:  The breasts were imaged using dynamic intravenous gadolinium infusion, Finalized by (CST): Erica Servin MD on 1/06/2021 at 2:27 PM          Medications and allergies reviewed: The following allergies were reported:  OtherOTHER (SEE COMMENTS)   SeafoodITCHING  SeasonalRunny nose  Current Outpatient Medications   Medicati

## 2021-01-15 ENCOUNTER — OFFICE VISIT (OUTPATIENT)
Dept: SURGERY | Facility: CLINIC | Age: 51
End: 2021-01-15
Payer: COMMERCIAL

## 2021-01-15 VITALS — TEMPERATURE: 97 F

## 2021-01-15 DIAGNOSIS — D24.1 PAPILLOMA OF RIGHT BREAST: ICD-10-CM

## 2021-01-15 DIAGNOSIS — Z80.3 FAMILY HISTORY OF BREAST CANCER: ICD-10-CM

## 2021-01-15 DIAGNOSIS — N60.99 ATYPICAL DUCTAL HYPERPLASIA OF BREAST: Primary | ICD-10-CM

## 2021-01-15 DIAGNOSIS — Z98.890 S/P RIGHT BREAST BIOPSY: ICD-10-CM

## 2021-01-15 PROCEDURE — 99214 OFFICE O/P EST MOD 30 MIN: CPT | Performed by: SURGERY

## 2021-01-17 NOTE — PROGRESS NOTES
Follow Up Visit Note       Active Problems      1. Atypical ductal hyperplasia of breast    2. Papilloma of right breast    3. Family history of breast cancer    4.  S/P right breast biopsy          Chief Complaint   Patient presents with:  Post-Op: pt is s Take 1 tablet (5 mg total) by mouth daily. , Disp: 90 tablet, Rfl: 0  •  Omega-3 Fatty Acids (OMEGA-3 OR), Take 1 tablet by mouth nightly., Disp: , Rfl:   •  ibuprofen 200 MG Oral Tab, Take 200 mg by mouth every 6 (six) hours as needed for Pain., Disp: , Rf Constitutional: She is oriented to person, place, and time. She appears well-developed and well-nourished. HENT:   Head: Normocephalic and atraumatic. Eyes: Pupils are equal, round, and reactive to light.  EOM are normal.   Cardiovascular: Normal rate that the discomfort has actually improved over the past several days.     She and her  did see Dr. Rani Burciaga on March 2, 2020 to discuss chemoprevention  She has not yet finalized her decision regarding starting hormone therapy    Mammogram in [place]

## 2021-01-20 ENCOUNTER — HOSPITAL ENCOUNTER (OUTPATIENT)
Dept: MRI IMAGING | Facility: HOSPITAL | Age: 51
Discharge: HOME | End: 2021-01-20
Attending: SURGERY
Payer: COMMERCIAL

## 2021-02-05 ENCOUNTER — HOSPITAL ENCOUNTER (OUTPATIENT)
Dept: MRI IMAGING | Facility: HOSPITAL | Age: 51
Discharge: HOME | End: 2021-02-05
Attending: SURGERY
Payer: COMMERCIAL

## 2021-02-05 ENCOUNTER — HOSPITAL ENCOUNTER (OUTPATIENT)
Dept: MAMMOGRAPHY | Facility: HOSPITAL | Age: 51
Discharge: HOME | End: 2021-02-05
Attending: SURGERY
Payer: COMMERCIAL

## 2021-02-05 DIAGNOSIS — N63.0 BREAST NODULE: ICD-10-CM

## 2021-02-05 PROCEDURE — 88305 TISSUE EXAM BY PATHOLOGIST: CPT | Performed by: SURGERY

## 2021-02-05 PROCEDURE — 76937 US GUIDE VASCULAR ACCESS: CPT

## 2021-02-05 PROCEDURE — A9575 INJ GADOTERATE MEGLUMI 0.1ML: HCPCS | Performed by: SURGERY

## 2021-02-05 PROCEDURE — 19085 BX BREAST 1ST LESION MR IMAG: CPT | Performed by: SURGERY

## 2021-02-05 PROCEDURE — 77065 DX MAMMO INCL CAD UNI: CPT | Performed by: SURGERY

## 2021-02-05 PROCEDURE — 36410 VNPNXR 3YR/> PHY/QHP DX/THER: CPT

## 2021-02-05 PROCEDURE — 88342 IMHCHEM/IMCYTCHM 1ST ANTB: CPT | Performed by: SURGERY

## 2021-02-05 NOTE — IMAGING NOTE
Pt provided juice at this time. Mother at bedside. No other needs.    Received patient in MRI dressing room, alert, coherent, denies CP distress. Procedure signed consent after addressing concerns and questions. Assisted patient to room 1 MRI, provided position of comfort while on a biopsy position. Time out performed.   Biop

## 2021-02-15 ENCOUNTER — NURSE NAVIGATOR ENCOUNTER (OUTPATIENT)
Dept: HEMATOLOGY/ONCOLOGY | Facility: HOSPITAL | Age: 51
End: 2021-02-15

## 2021-02-15 NOTE — PROGRESS NOTES
Called pt on behalf of Dilan Moss to move her appointment in [facility] at 1000 to 0900 for 2/16/2021. Pt confirmed tomorrow's appointment with Dilan Moss for 2/16/2021 at 0900 in [facility].     Pt was provided with br

## 2021-02-16 ENCOUNTER — OFFICE VISIT (OUTPATIENT)
Dept: SURGERY | Facility: CLINIC | Age: 51
End: 2021-02-16
Payer: COMMERCIAL

## 2021-02-16 ENCOUNTER — OFFICE VISIT (OUTPATIENT)
Dept: HEMATOLOGY/ONCOLOGY | Facility: HOSPITAL | Age: 51
End: 2021-02-16
Attending: SURGERY
Payer: COMMERCIAL

## 2021-02-16 VITALS
TEMPERATURE: 98 F | WEIGHT: 293 LBS | DIASTOLIC BLOOD PRESSURE: 86 MMHG | SYSTOLIC BLOOD PRESSURE: 168 MMHG | OXYGEN SATURATION: 96 % | RESPIRATION RATE: 16 BRPM | HEART RATE: 117 BPM | BODY MASS INDEX: 48.82 KG/M2 | HEIGHT: 65 IN

## 2021-02-16 DIAGNOSIS — D24.1 PAPILLOMA OF RIGHT BREAST: ICD-10-CM

## 2021-02-16 DIAGNOSIS — Z80.3 FAMILY HISTORY OF BREAST CANCER: ICD-10-CM

## 2021-02-16 DIAGNOSIS — Z98.890 S/P RIGHT BREAST BIOPSY: ICD-10-CM

## 2021-02-16 DIAGNOSIS — N60.99 ATYPICAL DUCTAL HYPERPLASIA OF BREAST: Primary | ICD-10-CM

## 2021-02-16 DIAGNOSIS — I10 ESSENTIAL HYPERTENSION: ICD-10-CM

## 2021-02-16 PROCEDURE — 99211 OFF/OP EST MAY X REQ PHY/QHP: CPT

## 2021-02-16 PROCEDURE — 99214 OFFICE O/P EST MOD 30 MIN: CPT | Performed by: SURGERY

## 2021-02-16 RX ORDER — AMLODIPINE BESYLATE 5 MG/1
TABLET ORAL
Qty: 90 TABLET | Refills: 0 | Status: SHIPPED | OUTPATIENT
Start: 2021-02-16 | End: 2021-07-31

## 2021-02-16 RX ORDER — LOSARTAN POTASSIUM 50 MG/1
TABLET ORAL
Qty: 90 TABLET | Refills: 0 | Status: SHIPPED | OUTPATIENT
Start: 2021-02-16 | End: 2021-07-31

## 2021-02-19 NOTE — PROGRESS NOTES
Follow Up Visit Note       Active Problems      1. Atypical ductal hyperplasia of breast    2. Papilloma of right breast    3. Family history of breast cancer    4. S/P right breast biopsy          Chief Complaint   No chief complaint on file.         Histo time of procedure., Disp: 5 tablet, Rfl: 0  •  Omega-3 Fatty Acids (OMEGA-3 OR), Take 1 tablet by mouth nightly., Disp: , Rfl:   •  ibuprofen 200 MG Oral Tab, Take 200 mg by mouth every 6 (six) hours as needed for Pain., Disp: , Rfl:   •  acetaminophen 500 incision     The final pathology was consistent with ADH, FEA, intraductal Pap dominance.  There was no evidence of in situ or invasive carcinoma.   Mariia Pantoja is here today because she noted some discomfort in the right nipple.  On examination there is no eviden types were placed in this encounter. Imaging & Referrals   None    Follow Up  No follow-ups on file.     Gunner Pedroza MD

## 2021-03-13 DIAGNOSIS — Z23 NEED FOR VACCINATION: ICD-10-CM

## 2021-03-15 ENCOUNTER — IMMUNIZATION (OUTPATIENT)
Dept: LAB | Age: 51
End: 2021-03-15
Attending: HOSPITALIST
Payer: COMMERCIAL

## 2021-03-15 DIAGNOSIS — Z23 NEED FOR VACCINATION: Primary | ICD-10-CM

## 2021-03-15 PROCEDURE — 0001A SARSCOV2 VAC 30MCG/0.3ML IM: CPT

## 2021-04-06 ENCOUNTER — IMMUNIZATION (OUTPATIENT)
Dept: LAB | Age: 51
End: 2021-04-06
Attending: HOSPITALIST
Payer: COMMERCIAL

## 2021-04-06 DIAGNOSIS — Z23 NEED FOR VACCINATION: Primary | ICD-10-CM

## 2021-04-06 PROCEDURE — 0002A SARSCOV2 VAC 30MCG/0.3ML IM: CPT

## 2021-06-07 ENCOUNTER — PATIENT MESSAGE (OUTPATIENT)
Dept: INTERNAL MEDICINE CLINIC | Facility: CLINIC | Age: 51
End: 2021-06-07

## 2021-06-07 DIAGNOSIS — R05.9 COUGH: Primary | ICD-10-CM

## 2021-06-07 DIAGNOSIS — R05.3 CHRONIC COUGH: ICD-10-CM

## 2021-06-07 DIAGNOSIS — J45.909 ASTHMA, UNSPECIFIED ASTHMA SEVERITY, UNSPECIFIED WHETHER COMPLICATED, UNSPECIFIED WHETHER PERSISTENT: ICD-10-CM

## 2021-06-07 DIAGNOSIS — R06.2 WHEEZING: ICD-10-CM

## 2021-06-08 NOTE — TELEPHONE ENCOUNTER
From: Jorge Newell  Sent: 6/8/2021 9:07 AM CDT  To: [facility] Clinical Staff  Subject: RE: Prescription Question    Hello,  The wheezing isn't powerful, but subtle, compared to what I have experienced in the past. It has been in the last week that I n

## 2021-06-15 RX ORDER — ALBUTEROL SULFATE 90 UG/1
2 AEROSOL, METERED RESPIRATORY (INHALATION)
Qty: 1 EACH | Refills: 0 | Status: SHIPPED | OUTPATIENT
Start: 2021-06-15

## 2021-07-22 ENCOUNTER — OFFICE VISIT (OUTPATIENT)
Dept: SURGERY | Facility: CLINIC | Age: 51
End: 2021-07-22
Payer: COMMERCIAL

## 2021-07-22 VITALS — SYSTOLIC BLOOD PRESSURE: 147 MMHG | TEMPERATURE: 97 F | HEART RATE: 102 BPM | DIASTOLIC BLOOD PRESSURE: 84 MMHG

## 2021-07-22 DIAGNOSIS — D24.1 PAPILLOMA OF RIGHT BREAST: ICD-10-CM

## 2021-07-22 DIAGNOSIS — Z80.3 FAMILY HISTORY OF BREAST CANCER: ICD-10-CM

## 2021-07-22 DIAGNOSIS — Z98.890 S/P RIGHT BREAST BIOPSY: ICD-10-CM

## 2021-07-22 DIAGNOSIS — N64.52 DISCHARGE FROM RIGHT NIPPLE: Primary | ICD-10-CM

## 2021-07-22 DIAGNOSIS — N60.99 ATYPICAL DUCTAL HYPERPLASIA OF BREAST: ICD-10-CM

## 2021-07-22 PROCEDURE — 3077F SYST BP >= 140 MM HG: CPT | Performed by: SURGERY

## 2021-07-22 PROCEDURE — 99215 OFFICE O/P EST HI 40 MIN: CPT | Performed by: SURGERY

## 2021-07-22 PROCEDURE — 3079F DIAST BP 80-89 MM HG: CPT | Performed by: SURGERY

## 2021-07-22 NOTE — PROGRESS NOTES
Follow Up Visit Note       Active Problems      1. Discharge from right nipple    2. Atypical ductal hyperplasia of breast    3. Papilloma of right breast    4. Family history of breast cancer    5.  S/P right breast biopsy          Chief Complaint   Patien , Disp: 90 tablet, Rfl: 0  •  diazepam (VALIUM) 5 MG Oral Tab, Take 1 tablet 30 minutes prior to procedure and may repeat at time of procedure., Disp: 5 tablet, Rfl: 0  •  Omega-3 Fatty Acids (OMEGA-3 OR), Take 1 tablet by mouth nightly., Disp: , Rfl:   • Physical Findings   /84   Pulse 102   Temp 96.8 °F (36 °C)   LMP 09/24/2020 (Exact Date)   Physical Exam  Constitutional:       Appearance: She is well-developed. HENT:      Head: Normocephalic and atraumatic.    Eyes:      Pupils: Pupils are [place] discuss chemoprevention on 3/2/2020 and declined hormonal therapy at that time. MBI was performed 2020 and in the bilateral breast, areas of asymmetric tracer uptake breast and left upper outer quadrant was noted.   MRI was recommended  MRI performed [place] RIGHT (JQW=[id number])  [facility] AISHA 2D+3D DIAGNOSTIC [facility]  BILAT (CPT=77066/78157)    Follow Up  No follow-ups on file.     Karolina Moore MD

## 2021-07-31 ENCOUNTER — OFFICE VISIT (OUTPATIENT)
Dept: FAMILY MEDICINE CLINIC | Facility: CLINIC | Age: 51
End: 2021-07-31
Payer: COMMERCIAL

## 2021-07-31 VITALS — HEART RATE: 99 BPM | DIASTOLIC BLOOD PRESSURE: 96 MMHG | SYSTOLIC BLOOD PRESSURE: 142 MMHG

## 2021-07-31 DIAGNOSIS — I10 ESSENTIAL HYPERTENSION: ICD-10-CM

## 2021-07-31 DIAGNOSIS — Z01.30 BLOOD PRESSURE CHECK: Primary | ICD-10-CM

## 2021-07-31 PROCEDURE — 3080F DIAST BP >= 90 MM HG: CPT | Performed by: NURSE PRACTITIONER

## 2021-07-31 PROCEDURE — 3077F SYST BP >= 140 MM HG: CPT | Performed by: NURSE PRACTITIONER

## 2021-07-31 NOTE — PROGRESS NOTES
Patient presents for free bp check. Bp 142/96 today. HR 99, pulse is regular      Patient reports she ran out of her losartan and has not called for a refill. Also reports her apple watch told her her HR was elevated earlier today while eating lunch.  D

## 2021-08-01 RX ORDER — LOSARTAN POTASSIUM 50 MG/1
TABLET ORAL
Qty: 90 TABLET | Refills: 0 | OUTPATIENT
Start: 2021-08-01

## 2021-08-01 RX ORDER — LOSARTAN POTASSIUM 50 MG/1
50 TABLET ORAL DAILY
Qty: 90 TABLET | Refills: 0 | Status: SHIPPED | OUTPATIENT
Start: 2021-08-01 | End: 2021-11-07

## 2021-08-01 RX ORDER — AMLODIPINE BESYLATE 5 MG/1
TABLET ORAL
Qty: 90 TABLET | Refills: 0 | OUTPATIENT
Start: 2021-08-01

## 2021-08-01 RX ORDER — AMLODIPINE BESYLATE 5 MG/1
5 TABLET ORAL DAILY
Qty: 90 TABLET | Refills: 0 | Status: SHIPPED | OUTPATIENT
Start: 2021-08-01 | End: 2021-11-07

## 2021-08-05 ENCOUNTER — HOSPITAL ENCOUNTER (OUTPATIENT)
Dept: MAMMOGRAPHY | Facility: HOSPITAL | Age: 51
Discharge: HOME | End: 2021-08-05
Attending: SURGERY
Payer: COMMERCIAL

## 2021-08-05 DIAGNOSIS — N64.52 DISCHARGE FROM RIGHT NIPPLE: ICD-10-CM

## 2021-08-05 PROCEDURE — 77066 DX MAMMO INCL CAD BI: CPT | Performed by: SURGERY

## 2021-08-05 PROCEDURE — 76642 ULTRASOUND BREAST LIMITED: CPT | Performed by: SURGERY

## 2021-08-05 PROCEDURE — 77062 BREAST TOMOSYNTHESIS BI: CPT | Performed by: SURGERY

## 2021-08-06 NOTE — PROGRESS NOTES
Relayed results to pt. Confirmed follow-up appointment.      Future Appointments  8/17/2021  9:00 AM    Rhonda Stein MD        [facility]           [facility] [address]

## 2021-08-30 ENCOUNTER — LAB ENCOUNTER (OUTPATIENT)
Dept: LAB | Age: 51
End: 2021-08-30
Attending: INTERNAL MEDICINE
Payer: COMMERCIAL

## 2021-08-30 DIAGNOSIS — R06.2 WHEEZING: ICD-10-CM

## 2021-08-30 DIAGNOSIS — J45.909 ASTHMA, UNSPECIFIED ASTHMA SEVERITY, UNSPECIFIED WHETHER COMPLICATED, UNSPECIFIED WHETHER PERSISTENT: ICD-10-CM

## 2021-08-30 DIAGNOSIS — R05.3 CHRONIC COUGH: ICD-10-CM

## 2021-08-31 LAB — SARS-COV-2 RNA RESP QL NAA+PROBE: NOT DETECTED

## 2021-09-01 ENCOUNTER — RT VISIT (OUTPATIENT)
Dept: RESPIRATORY THERAPY | Facility: HOSPITAL | Age: 51
End: 2021-09-01
Attending: INTERNAL MEDICINE
Payer: COMMERCIAL

## 2021-09-01 DIAGNOSIS — J45.909 ASTHMA, UNSPECIFIED ASTHMA SEVERITY, UNSPECIFIED WHETHER COMPLICATED, UNSPECIFIED WHETHER PERSISTENT: ICD-10-CM

## 2021-09-01 DIAGNOSIS — R05.3 CHRONIC COUGH: ICD-10-CM

## 2021-09-01 DIAGNOSIS — R06.2 WHEEZING: ICD-10-CM

## 2021-09-01 PROCEDURE — 94010 BREATHING CAPACITY TEST: CPT

## 2021-09-01 PROCEDURE — 94729 DIFFUSING CAPACITY: CPT

## 2021-09-01 PROCEDURE — 94726 PLETHYSMOGRAPHY LUNG VOLUMES: CPT

## 2021-09-03 NOTE — PROCEDURES
Findings:  FEV1 is 2.60L, 91% predicted. FVC is 3.24L, 91% predicted. FEV1/ FVC ratio is 0.80. The flow-volume loop demonstrates a normal pattern. The TLC is 5.51L, 108% predicted. The residual volume 2.27L, 127% predicted.   The diffusion capacity is

## 2021-09-07 ENCOUNTER — TELEPHONE (OUTPATIENT)
Dept: INTERNAL MEDICINE CLINIC | Facility: CLINIC | Age: 51
End: 2021-09-07

## 2021-09-07 DIAGNOSIS — R05.3 CHRONIC COUGH: Primary | ICD-10-CM

## 2021-09-07 DIAGNOSIS — R94.2 ABNORMAL PFT: ICD-10-CM

## 2021-09-07 NOTE — TELEPHONE ENCOUNTER
Ashley Blanco M.D.   Teresa Howell, [address] [place] [address] Rd  [phone]  LMOVM awaiting call back

## 2021-09-07 NOTE — TELEPHONE ENCOUNTER
----- Message from Abdulaziz zOuna MD sent at 9/4/2021 11:21 AM CDT -----  PFT may point towards asthma or reactive airways.  Refer pt to Dr Julee Hernadez (pulmonary) to eval if additional testing is needed

## 2021-09-10 NOTE — TELEPHONE ENCOUNTER
Discharge Instructions for:    Carolyn Hamman / [id number] : 1975    Admitted 2019 Discharged: 2019       Postoperative Hand Surgery Instructions      Elevation:  Elevation is one of the most important things to do following your surgery. Not only does it decrease swelling, but it also decreases pain. For hand or wrist surgery, keep the arm in your sling while up and about, with your hand above your elbow. When lying down, keep your arm propped up on a few pillows, so that your fingers are pointing towards the ceiling. Finger Motion:  **DO NOT attempt to move your fingers or wrist. You may, and should, move your thumb. Dressings:  Please leave the surgical dressing in place until you are seen in the office for your first post-operative appointment with Dr Eli Bejarano. The dressing helps to prevent infection and positions the extremity to protect the surgical procedure  that was performed. Bathing and showering are allowed as long as the dressing is kept dry. To keep your dressing dry while bathing, simply place a plastic bag (bread bag, newspaper bag, trash bag or subway sandwich bag) over the dressing and seal it with tape or a rubber band. Medications: You have likely been given a prescription for pain medication. Please follow the instructions closely. It is safe to take up to 600mg of Ibuprofen (Advil or Motrin) along with the pain prescription as long you are not allergic to it, are not on blood thinners, and do not have gastric reflux or an ulcer. However, do not take any additional tylenol/acetaminophen if your prescription contains tylenol. Note that my policy is to give only one prescription for pain medication at the time of the surgery - if you use it up quickly, then you will have no more pain medication left after only a few days, and I will not give you another prescription. So use your pain medication sparingly, and only when necessary!     If you have new or ERIK CLEMENT. Results and referral recommendations d/w pt she expressed understanding. Referral contact information given. increasing numbness after any numbing medicine wears off (12-24 hours for regional blocks, 3 hours for local), call the office immediately. If you experience nausea, vomiting or itching with the pain medication, please call the office during regular office hours. Refills for pain medication prescriptions ARE NOT given on the weekend or after regular office hours. If antibiotics have been prescribed, please be sure to complete the entire prescription. Post-Operative Appointments:  Dr. Guera Leal likes to see you back in the office about 10-14 days following your surgery to change the post-operative dressing and remove any necessary sutures or staples. If you have not received a follow up appointment card please contact our office at [phone]. *If you have any questions or concerns or experience any sudden changes in your condition, please call our office at [phone]*    Patient Education      Cephalexin (Bio-Cef, Keflex) - (By mouth)   Why this medicine is used:   Treats infections. Contact a nurse or doctor right away if you have:  · Blistering, peeling, or red skin rash  · Severe or bloody diarrhea     Common side effects:  · Mild diarrhea or nausea  © 2017 [address] is for End User's use only and may not be sold, redistributed or otherwise used for commercial purposes. Patient Education   Patient Education      Sulfamethoxazole/Trimethoprim (Bactrim, Bactrim DS, SMZ-TMP Pediatric, Septra) - (By mouth)   Why this medicine is used:   Treats or prevents infections.   Contact a nurse or doctor right away if you have:  Severe nausea, vomiting, or stomach pain  Dark urine or pale stools  Confusion, weakness  Severe or bloody diarrhea  Skin rash, purple spots on your skin, or very pale or yellow skin     Common side effects:  Mild nausea or vomiting  Loss of apetite  © 2017 [facility] Information is for End User's use only and may not be sold, redistributed or otherwise used for commercial purposes. Oxycodone, Rapid Release (ETH-Oxydose, Oxy IR, Roxicodone) - (By mouth)   Why this medicine is used:   Treats moderate to severe pain. This medicine is a narcotic pain reliever. Contact a nurse or doctor right away if you have:  · Fast or slow heart beat, shallow breathing, blue lips, skin or fingernails  · Anxiety, restlessness, fever, sweating, muscle spasms, twitching, seeing or hearing things that are not there  · Extreme weakness, shallow breathing, slow heartbeat  · Severe confusion, lightheadedness, dizziness, fainting  · Sweating or cold, clammy skin, seizures  · Severe constipation, stomach pain, nausea, vomiting     Common side effects:  · Mild constipation  · Sleepiness, tiredness  © 2017 [facility] Information is for End User's use only and may not be sold, redistributed or otherwise used for commercial purposes. DISCHARGE SUMMARY from Nurse    PATIENT INSTRUCTIONS:    After general anesthesia or intravenous sedation, for 24 hours or while taking prescription Narcotics:  · Limit your activities  · Do not drive and operate hazardous machinery  · Do not make important personal or business decisions  · Do  not drink alcoholic beverages  · If you have not urinated within 8 hours after discharge, please contact your surgeon on call.     Report the following to your surgeon:  · Excessive pain, swelling, redness or odor of or around the surgical area  · Temperature over 100.5  · Nausea and vomiting lasting longer than 4 hours or if unable to take medications  · Any signs of decreased circulation or nerve impairment to extremity: change in color, persistent  numbness, tingling, coldness or increase pain  · Any questions    These are general instructions for a healthy lifestyle:    No smoking/ No tobacco products/ Avoid exposure to second hand smoke  Surgeon General's Warning:  Quitting smoking now greatly reduces serious risk to your health. Obesity, smoking, and sedentary lifestyle greatly increases your risk for illness    A healthy diet, regular physical exercise & weight monitoring are important for maintaining a healthy lifestyle    You may be retaining fluid if you have a history of heart failure or if you experience any of the following symptoms:  Weight gain of 3 pounds or more overnight or 5 pounds in a week, increased swelling in our hands or feet or shortness of breath while lying flat in bed. Please call your doctor as soon as you notice any of these symptoms; do not wait until your next office visit. Recognize signs and symptoms of STROKE:    F-face looks uneven    A-arms unable to move or move unevenly    S-speech slurred or non-existent    T-time-call 911 as soon as signs and symptoms begin-DO NOT go       Back to bed or wait to see if you get better-TIME IS BRAIN. Warning Signs of HEART ATTACK     Call 911 if you have these symptoms:   Chest discomfort. Most heart attacks involve discomfort in the center of the chest that lasts more than a few minutes, or that goes away and comes back. It can feel like uncomfortable pressure, squeezing, fullness, or pain.  Discomfort in other areas of the upper body. Symptoms can include pain or discomfort in one or both arms, the back, neck, jaw, or stomach.  Shortness of breath with or without chest discomfort.  Other signs may include breaking out in a cold sweat, nausea, or lightheadedness. Don't wait more than five minutes to call 911 - MINUTES MATTER! Fast action can save your life. Calling 911 is almost always the fastest way to get lifesaving treatment. Emergency Medical Services staff can begin treatment when they arrive -- up to an hour sooner than if someone gets to the hospital by car. The discharge information has been reviewed with the {PATIENT PARENT GUARDIAN:74873}. The {PATIENT PARENT GUARDIAN:86337} verbalized understanding.   Discharge medications reviewed with the {Dishcarge meds reviewed ACPE:78785} and appropriate educational materials and side effects teaching were provided.   ___________________________________________________________________________________________________________________________________

## 2021-09-24 ENCOUNTER — OFFICE VISIT (OUTPATIENT)
Dept: INTERNAL MEDICINE CLINIC | Facility: CLINIC | Age: 51
End: 2021-09-24
Payer: COMMERCIAL

## 2021-09-24 VITALS
RESPIRATION RATE: 16 BRPM | TEMPERATURE: 98 F | BODY MASS INDEX: 48.82 KG/M2 | WEIGHT: 293 LBS | SYSTOLIC BLOOD PRESSURE: 132 MMHG | HEIGHT: 65 IN | HEART RATE: 84 BPM | OXYGEN SATURATION: 98 % | DIASTOLIC BLOOD PRESSURE: 82 MMHG

## 2021-09-24 DIAGNOSIS — Z00.00 ANNUAL PHYSICAL EXAM: Primary | ICD-10-CM

## 2021-09-24 DIAGNOSIS — Z13.220 LIPID SCREENING: ICD-10-CM

## 2021-09-24 DIAGNOSIS — Z23 NEED FOR INFLUENZA VACCINATION: ICD-10-CM

## 2021-09-24 DIAGNOSIS — Z23 NEED FOR PNEUMOCOCCAL VACCINATION: ICD-10-CM

## 2021-09-24 DIAGNOSIS — Z00.00 LABORATORY EXAMINATION ORDERED AS PART OF A ROUTINE GENERAL MEDICAL EXAMINATION: ICD-10-CM

## 2021-09-24 DIAGNOSIS — Z12.11 COLON CANCER SCREENING: ICD-10-CM

## 2021-09-24 DIAGNOSIS — E55.9 VITAMIN D DEFICIENCY: ICD-10-CM

## 2021-09-24 DIAGNOSIS — Z13.0 SCREENING, IRON DEFICIENCY ANEMIA: ICD-10-CM

## 2021-09-24 DIAGNOSIS — Z13.89 SCREENING FOR GENITOURINARY CONDITION: ICD-10-CM

## 2021-09-24 DIAGNOSIS — Z13.29 THYROID DISORDER SCREEN: ICD-10-CM

## 2021-09-24 PROCEDURE — 90732 PPSV23 VACC 2 YRS+ SUBQ/IM: CPT | Performed by: INTERNAL MEDICINE

## 2021-09-24 PROCEDURE — 90472 IMMUNIZATION ADMIN EACH ADD: CPT | Performed by: INTERNAL MEDICINE

## 2021-09-24 PROCEDURE — 3079F DIAST BP 80-89 MM HG: CPT | Performed by: INTERNAL MEDICINE

## 2021-09-24 PROCEDURE — 3008F BODY MASS INDEX DOCD: CPT | Performed by: INTERNAL MEDICINE

## 2021-09-24 PROCEDURE — 90686 IIV4 VACC NO PRSV 0.5 ML IM: CPT | Performed by: INTERNAL MEDICINE

## 2021-09-24 PROCEDURE — 99396 PREV VISIT EST AGE 40-64: CPT | Performed by: INTERNAL MEDICINE

## 2021-09-24 PROCEDURE — 3075F SYST BP GE 130 - 139MM HG: CPT | Performed by: INTERNAL MEDICINE

## 2021-09-24 PROCEDURE — 90471 IMMUNIZATION ADMIN: CPT | Performed by: INTERNAL MEDICINE

## 2021-09-24 NOTE — PATIENT INSTRUCTIONS

## 2021-09-24 NOTE — PROGRESS NOTES
Subjective:   Patient ID: Trina Ozuna is a 49year old female. HPI  HPI:   Trina Ozuna is a 49year old female who presents for a complete physical exam. Symptoms: denies discharge, itching, burning or dysuria.  Patient complains of not Potassium 50 MG Oral Tab Take 1 tablet (50 mg total) by mouth daily. 90 tablet 0   • Albuterol Sulfate HFA (PROAIR HFA) 108 (90 Base) MCG/ACT Inhalation Aero Soln Inhale 2 puffs into the lungs every 6 (six) hours while awake.  1 each 0   • Omega-3 Fatty Aci Use: Never used    Alcohol use: No      Comment: rarely    Drug use: No    Occ: yes. : yes. Children: lluvia. Exercise: once per week,  twice per week.   Diet: watches fats closely and watches sugar closely     REVIEW OF SYSTEMS:   GENERAL: feels wel current med plan  Pt info handouts given for: exercise, low fat diet and breast self-exam.Body mass index is 49.26 kg/m². , recommended low fat diet and aerobic exercise 30 minutes three times weekly.     The patient indicates understanding of these issues a deficiency    Orders Placed This Encounter      CBC With Differential With Platelet      Comp Metabolic Panel (14)      Hemoglobin A1C      Lipid Panel      Urinalysis, Routine      TSH W Reflex To Free T4      Vitamin D [E]      Flulaval 6 months and olde

## 2021-10-16 ENCOUNTER — LAB ENCOUNTER (OUTPATIENT)
Dept: LAB | Age: 51
End: 2021-10-16
Attending: INTERNAL MEDICINE
Payer: COMMERCIAL

## 2021-10-16 DIAGNOSIS — E55.9 VITAMIN D DEFICIENCY: ICD-10-CM

## 2021-10-16 DIAGNOSIS — Z13.29 THYROID DISORDER SCREEN: ICD-10-CM

## 2021-10-16 DIAGNOSIS — Z13.0 SCREENING, IRON DEFICIENCY ANEMIA: ICD-10-CM

## 2021-10-16 DIAGNOSIS — Z13.89 SCREENING FOR GENITOURINARY CONDITION: ICD-10-CM

## 2021-10-16 DIAGNOSIS — Z00.00 LABORATORY EXAMINATION ORDERED AS PART OF A ROUTINE GENERAL MEDICAL EXAMINATION: ICD-10-CM

## 2021-10-16 DIAGNOSIS — D50.9 MICROCYTIC ANEMIA: ICD-10-CM

## 2021-10-16 DIAGNOSIS — Z13.220 LIPID SCREENING: ICD-10-CM

## 2021-10-16 PROCEDURE — 36415 COLL VENOUS BLD VENIPUNCTURE: CPT

## 2021-10-16 PROCEDURE — 84443 ASSAY THYROID STIM HORMONE: CPT

## 2021-10-16 PROCEDURE — 83540 ASSAY OF IRON: CPT

## 2021-10-16 PROCEDURE — 85025 COMPLETE CBC W/AUTO DIFF WBC: CPT

## 2021-10-16 PROCEDURE — 81001 URINALYSIS AUTO W/SCOPE: CPT

## 2021-10-16 PROCEDURE — 80061 LIPID PANEL: CPT

## 2021-10-16 PROCEDURE — 83036 HEMOGLOBIN GLYCOSYLATED A1C: CPT

## 2021-10-16 PROCEDURE — 82306 VITAMIN D 25 HYDROXY: CPT

## 2021-10-16 PROCEDURE — 83550 IRON BINDING TEST: CPT

## 2021-10-16 PROCEDURE — 80053 COMPREHEN METABOLIC PANEL: CPT

## 2021-10-16 PROCEDURE — 82728 ASSAY OF FERRITIN: CPT

## 2021-10-18 ENCOUNTER — TELEPHONE (OUTPATIENT)
Dept: INTERNAL MEDICINE CLINIC | Facility: CLINIC | Age: 51
End: 2021-10-18

## 2021-10-18 DIAGNOSIS — D50.9 MICROCYTIC ANEMIA: Primary | ICD-10-CM

## 2021-10-18 RX ORDER — NITROFURANTOIN 25; 75 MG/1; MG/1
100 CAPSULE ORAL 2 TIMES DAILY
Qty: 10 CAPSULE | Refills: 0 | Status: CANCELLED | OUTPATIENT
Start: 2021-10-18 | End: 2021-10-23

## 2021-10-18 NOTE — TELEPHONE ENCOUNTER
Spoke with Marck from [facility] and was able to add on orders to recent blood draw. Orders placed. [facility] [address] [facility] [address]

## 2021-10-18 NOTE — TELEPHONE ENCOUNTER
----- Message from Cori Galeazzi, MD sent at 10/17/2021 11:03 AM CDT -----  No dm2 but prediabetes per a1c. Stay consistent on low sugar/carb diet  Renal/lft are normal  Thyroid functions are normal  UA consistent with bacteruria.  Treat with macrobid 100 mg

## 2021-10-18 NOTE — TELEPHONE ENCOUNTER
Results and recommendations d/w pt. She expressed understanding. Pt denies UTI symptoms.  Pt will schedule with GI.

## 2021-10-18 NOTE — TELEPHONE ENCOUNTER
Patient returning call, please call back. Unna Boot Text: An Unna boot was placed to help immobilize the limb and facilitate more rapid healing.

## 2021-11-06 DIAGNOSIS — I10 ESSENTIAL HYPERTENSION: ICD-10-CM

## 2021-11-07 RX ORDER — AMLODIPINE BESYLATE 5 MG/1
TABLET ORAL
Qty: 90 TABLET | Refills: 0 | Status: SHIPPED | OUTPATIENT
Start: 2021-11-07

## 2021-11-07 RX ORDER — LOSARTAN POTASSIUM 50 MG/1
50 TABLET ORAL DAILY
Qty: 90 TABLET | Refills: 0 | OUTPATIENT
Start: 2021-11-07

## 2021-11-07 RX ORDER — LOSARTAN POTASSIUM 50 MG/1
TABLET ORAL
Qty: 90 TABLET | Refills: 0 | Status: SHIPPED | OUTPATIENT
Start: 2021-11-07

## 2021-11-07 RX ORDER — AMLODIPINE BESYLATE 5 MG/1
5 TABLET ORAL DAILY
Qty: 90 TABLET | Refills: 0 | OUTPATIENT
Start: 2021-11-07

## 2021-11-16 ENCOUNTER — IMMUNIZATION (OUTPATIENT)
Dept: LAB | Facility: HOSPITAL | Age: 51
End: 2021-11-16
Attending: EMERGENCY MEDICINE
Payer: COMMERCIAL

## 2021-11-16 DIAGNOSIS — Z23 NEED FOR VACCINATION: Primary | ICD-10-CM

## 2021-11-16 PROCEDURE — 0003A SARSCOV2 VAC 30MCG/0.3ML IM: CPT | Performed by: FAMILY MEDICINE

## 2021-12-27 ENCOUNTER — LAB ENCOUNTER (OUTPATIENT)
Dept: LAB | Age: 51
End: 2021-12-27
Attending: INTERNAL MEDICINE
Payer: COMMERCIAL

## 2021-12-27 DIAGNOSIS — Z12.9 CANCER SCREENING: ICD-10-CM

## 2021-12-30 ENCOUNTER — ANESTHESIA EVENT (OUTPATIENT)
Dept: ENDOSCOPY | Facility: HOSPITAL | Age: 51
End: 2021-12-30
Payer: COMMERCIAL

## 2021-12-30 ENCOUNTER — ANESTHESIA (OUTPATIENT)
Dept: ENDOSCOPY | Facility: HOSPITAL | Age: 51
End: 2021-12-30
Payer: COMMERCIAL

## 2021-12-30 ENCOUNTER — HOSPITAL ENCOUNTER (OUTPATIENT)
Facility: HOSPITAL | Age: 51
Setting detail: HOSPITAL OUTPATIENT SURGERY
Discharge: HOME | End: 2021-12-30
Attending: INTERNAL MEDICINE | Admitting: INTERNAL MEDICINE
Payer: COMMERCIAL

## 2021-12-30 VITALS
DIASTOLIC BLOOD PRESSURE: 77 MMHG | OXYGEN SATURATION: 100 % | HEART RATE: 84 BPM | TEMPERATURE: 98 F | WEIGHT: 293 LBS | HEIGHT: 65 IN | SYSTOLIC BLOOD PRESSURE: 129 MMHG | RESPIRATION RATE: 18 BRPM | BODY MASS INDEX: 48.82 KG/M2

## 2021-12-30 DIAGNOSIS — Z12.9 CANCER SCREENING: Primary | ICD-10-CM

## 2021-12-30 DIAGNOSIS — Z12.11 COLON CANCER SCREENING: ICD-10-CM

## 2021-12-30 LAB — B-HCG UR QL: NEGATIVE

## 2021-12-30 PROCEDURE — 0DBN8ZX EXCISION OF SIGMOID COLON, VIA NATURAL OR ARTIFICIAL OPENING ENDOSCOPIC, DIAGNOSTIC: ICD-10-PCS | Performed by: INTERNAL MEDICINE

## 2021-12-30 PROCEDURE — 88305 TISSUE EXAM BY PATHOLOGIST: CPT | Performed by: INTERNAL MEDICINE

## 2021-12-30 PROCEDURE — 88342 IMHCHEM/IMCYTCHM 1ST ANTB: CPT | Performed by: INTERNAL MEDICINE

## 2021-12-30 PROCEDURE — 0DBK8ZX EXCISION OF ASCENDING COLON, VIA NATURAL OR ARTIFICIAL OPENING ENDOSCOPIC, DIAGNOSTIC: ICD-10-PCS | Performed by: INTERNAL MEDICINE

## 2021-12-30 PROCEDURE — 81025 URINE PREGNANCY TEST: CPT

## 2021-12-30 PROCEDURE — 88341 IMHCHEM/IMCYTCHM EA ADD ANTB: CPT | Performed by: INTERNAL MEDICINE

## 2021-12-30 RX ORDER — NALOXONE HYDROCHLORIDE 0.4 MG/ML
80 INJECTION, SOLUTION INTRAMUSCULAR; INTRAVENOUS; SUBCUTANEOUS AS NEEDED
OUTPATIENT
Start: 2021-12-30 | End: 2021-12-30

## 2021-12-30 RX ORDER — DEXTROSE MONOHYDRATE 25 G/50ML
50 INJECTION, SOLUTION INTRAVENOUS
OUTPATIENT
Start: 2021-12-30

## 2021-12-30 RX ORDER — ONDANSETRON 2 MG/ML
4 INJECTION INTRAMUSCULAR; INTRAVENOUS AS NEEDED
OUTPATIENT
Start: 2021-12-30 | End: 2021-12-30

## 2021-12-30 RX ORDER — SODIUM CHLORIDE, SODIUM LACTATE, POTASSIUM CHLORIDE, CALCIUM CHLORIDE 600; 310; 30; 20 MG/100ML; MG/100ML; MG/100ML; MG/100ML
INJECTION, SOLUTION INTRAVENOUS CONTINUOUS
OUTPATIENT
Start: 2021-12-30

## 2021-12-30 RX ORDER — SODIUM CHLORIDE, SODIUM LACTATE, POTASSIUM CHLORIDE, CALCIUM CHLORIDE 600; 310; 30; 20 MG/100ML; MG/100ML; MG/100ML; MG/100ML
INJECTION, SOLUTION INTRAVENOUS CONTINUOUS
Status: DISCONTINUED | OUTPATIENT
Start: 2021-12-30 | End: 2021-12-30

## 2021-12-30 RX ORDER — LIDOCAINE HYDROCHLORIDE 10 MG/ML
INJECTION, SOLUTION EPIDURAL; INFILTRATION; INTRACAUDAL; PERINEURAL AS NEEDED
Status: DISCONTINUED | OUTPATIENT
Start: 2021-12-30 | End: 2021-12-30 | Stop reason: SURG

## 2021-12-30 RX ADMIN — SODIUM CHLORIDE, SODIUM LACTATE, POTASSIUM CHLORIDE, CALCIUM CHLORIDE: 600; 310; 30; 20 INJECTION, SOLUTION INTRAVENOUS at 13:10:00

## 2021-12-30 RX ADMIN — SODIUM CHLORIDE, SODIUM LACTATE, POTASSIUM CHLORIDE, CALCIUM CHLORIDE: 600; 310; 30; 20 INJECTION, SOLUTION INTRAVENOUS at 12:34:00

## 2021-12-30 RX ADMIN — LIDOCAINE HYDROCHLORIDE 30 MG: 10 INJECTION, SOLUTION EPIDURAL; INFILTRATION; INTRACAUDAL; PERINEURAL at 12:38:00

## 2021-12-30 NOTE — OPERATIVE REPORT
[address] Patient Status:  Hospital Outpatient Surgery    3/19/1970 MRN [id number]   Location [id number] [place] [address] Attending Ivania Medrano MD   Date 2021 PCP Hoda Henderson MD     PREOPERATIVE DIAGNOSIS/INDICATION: sessile sigmoid colon polyp s/p hot snare polypectomy, moderate left sided diverticulosis  RECOMMENDATIONS:   - Post Colonoscopy/polypectomy precautions, watch for bleeding, infection, perforation, adverse drug reactions   - Follow biopsies.  - Repeat colo

## 2021-12-30 NOTE — ANESTHESIA POSTPROCEDURE EVALUATION
[address] Patient Status:  Hospital Outpatient Surgery   Age/Gender 49year old female MRN [id number]   Location [address] [place] [address] Attending Melissa Jackson MD   Hosp Day # 0 PCP Cori Galeazzi, MD       An

## 2021-12-30 NOTE — H&P
[facility] Patient Status:  Hospital Outpatient Surgery    3/19/1970 MRN [id number]   Location [address] [place] [address] Attending Kevan Garrison MD   Date 2021 PCP MD ADI Trejo Intravenous, Continuous    Physical Exam:    Blood pressure 148/83, pulse 98, temperature 97.8 °F (36.6 °C), temperature source Temporal, resp.  rate 18, height 5' 5\" (1.651 m), weight 296 lb (134.3 kg), last menstrual period 12/29/2021, SpO2 98 %, not cur

## 2021-12-30 NOTE — ANESTHESIA PREPROCEDURE EVALUATION
PRE-OP EVALUATION    Patient Name: [facility]    Admit Diagnosis: Colon cancer screening [Z12.11]    Pre-op Diagnosis: Colon cancer screening [Z12.11]    COLONOSCOPY    Anesthesia Procedure: COLONOSCOPY (N/A )    Surgeon(s) and Role:     Shannon Pugh time        Allergies: Other, Seafood, and Seasonal      Anesthesia Evaluation    Patient summary reviewed.     Anesthetic Complications  (-) history of anesthetic complications         GI/Hepatic/Renal                                 Cardiovascular dental history. Pulmonary      Breath sounds clear to auscultation bilaterally. Other findings            ASA: 3   Plan: MAC  NPO status verified and patient meets guidelines.     Post-procedure pain management plan discussed with surg

## 2022-01-10 NOTE — PROGRESS NOTES
Date: 1/10/2022    To: Colt Pereira  : 3/19/1970     I hope this letter finds you doing well. I am writing to inform you of the following:         The biopsies obtained from your recent colonoscopy indicate that the polyps were adenomas which, a

## 2022-02-20 RX ORDER — LOSARTAN POTASSIUM 50 MG/1
50 TABLET ORAL DAILY
Qty: 90 TABLET | Refills: 0 | Status: SHIPPED | OUTPATIENT
Start: 2022-02-20

## 2022-02-20 RX ORDER — AMLODIPINE BESYLATE 5 MG/1
5 TABLET ORAL DAILY
Qty: 90 TABLET | Refills: 0 | Status: SHIPPED | OUTPATIENT
Start: 2022-02-20

## 2022-05-16 ENCOUNTER — PATIENT MESSAGE (OUTPATIENT)
Dept: INTERNAL MEDICINE CLINIC | Facility: CLINIC | Age: 52
End: 2022-05-16

## 2022-05-16 RX ORDER — LOSARTAN POTASSIUM 50 MG/1
50 TABLET ORAL DAILY
Qty: 90 TABLET | Refills: 0 | Status: SHIPPED | OUTPATIENT
Start: 2022-05-16

## 2022-05-16 RX ORDER — AMLODIPINE BESYLATE 5 MG/1
5 TABLET ORAL DAILY
Qty: 90 TABLET | Refills: 0 | Status: SHIPPED | OUTPATIENT
Start: 2022-05-16

## 2022-05-16 NOTE — TELEPHONE ENCOUNTER
Last time medication was refilled : both meds refilled 2/20/22 . 90 tabs w/0 refills   Last OV  9/24/21  Next OV No future appointments. [email or web address] message sent to pt as a reminder to schedule a f/u visit.

## 2022-05-18 ENCOUNTER — OFFICE VISIT (OUTPATIENT)
Dept: INTERNAL MEDICINE CLINIC | Facility: CLINIC | Age: 52
End: 2022-05-18
Payer: COMMERCIAL

## 2022-05-18 VITALS
SYSTOLIC BLOOD PRESSURE: 138 MMHG | TEMPERATURE: 98 F | HEIGHT: 65 IN | WEIGHT: 293 LBS | BODY MASS INDEX: 48.82 KG/M2 | OXYGEN SATURATION: 99 % | HEART RATE: 108 BPM | DIASTOLIC BLOOD PRESSURE: 86 MMHG | RESPIRATION RATE: 18 BRPM

## 2022-05-18 DIAGNOSIS — J45.909 ASTHMA, UNSPECIFIED ASTHMA SEVERITY, UNSPECIFIED WHETHER COMPLICATED, UNSPECIFIED WHETHER PERSISTENT: ICD-10-CM

## 2022-05-18 DIAGNOSIS — E66.01 MORBIDLY OBESE (HCC): ICD-10-CM

## 2022-05-18 DIAGNOSIS — I10 ESSENTIAL HYPERTENSION: Primary | ICD-10-CM

## 2022-05-18 PROCEDURE — 3008F BODY MASS INDEX DOCD: CPT | Performed by: NURSE PRACTITIONER

## 2022-05-18 PROCEDURE — 99214 OFFICE O/P EST MOD 30 MIN: CPT | Performed by: NURSE PRACTITIONER

## 2022-05-18 PROCEDURE — 3079F DIAST BP 80-89 MM HG: CPT | Performed by: NURSE PRACTITIONER

## 2022-05-18 PROCEDURE — 3075F SYST BP GE 130 - 139MM HG: CPT | Performed by: NURSE PRACTITIONER

## 2022-06-09 NOTE — TELEPHONE ENCOUNTER
Per dr Ernesto kebede to start albuterol inhaler prn and complete PFT to assess for asthma Relevant Problems   No relevant active problems       Anesthetic History   No history of anesthetic complications            Review of Systems / Medical History  Patient summary reviewed, nursing notes reviewed and pertinent labs reviewed    Pulmonary    COPD    Sleep apnea  Smoker (none this AM)         Neuro/Psych   Within defined limits           Cardiovascular  Within defined limits                Exercise tolerance: >4 METS     GI/Hepatic/Renal  Within defined limits              Endo/Other        Arthritis and cancer (Left upper lobectomy 2019)     Other Findings              Physical Exam    Airway  Mallampati: II  TM Distance: 4 - 6 cm  Neck ROM: normal range of motion   Mouth opening: Normal     Cardiovascular               Dental    Dentition: Edentulous     Pulmonary                 Abdominal         Other Findings            Anesthetic Plan    ASA: 3  Anesthesia type: general          Induction: Intravenous  Anesthetic plan and risks discussed with: Patient and Spouse

## 2022-07-06 ENCOUNTER — PATIENT MESSAGE (OUTPATIENT)
Facility: LOCATION | Age: 52
End: 2022-07-06

## 2022-07-06 DIAGNOSIS — N60.99 ATYPICAL DUCTAL HYPERPLASIA OF BREAST: Primary | ICD-10-CM

## 2022-07-06 DIAGNOSIS — Z80.3 FAMILY HISTORY OF BREAST CANCER: ICD-10-CM

## 2022-07-06 NOTE — TELEPHONE ENCOUNTER
From: Paloma Arthur  To: Ronal Bernard MD  Sent: 7/6/2022 10:09 AM CDT  Subject: Mammogram due? Hi,  I received a letter sharing that I am due for my next mammogram. Do you need to enter an order for me to schedule this? I presume it will be the standard mammogram with possible ultrasound follow up potential, should they find something.  Please advise and/or create the order for the mammogram.    Thank you,  Alfonso Carlson

## 2022-08-23 DIAGNOSIS — I10 ESSENTIAL HYPERTENSION: ICD-10-CM

## 2022-08-23 RX ORDER — AMLODIPINE BESYLATE 5 MG/1
5 TABLET ORAL DAILY
Qty: 90 TABLET | Refills: 0 | Status: SHIPPED | OUTPATIENT
Start: 2022-08-23

## 2022-08-29 ENCOUNTER — HOSPITAL ENCOUNTER (OUTPATIENT)
Dept: MAMMOGRAPHY | Age: 52
Discharge: HOME | End: 2022-08-29
Attending: SURGERY
Payer: COMMERCIAL

## 2022-08-29 DIAGNOSIS — N60.99 ATYPICAL DUCTAL HYPERPLASIA OF BREAST: ICD-10-CM

## 2022-08-29 DIAGNOSIS — Z80.3 FAMILY HISTORY OF BREAST CANCER: ICD-10-CM

## 2022-08-29 PROCEDURE — 77066 DX MAMMO INCL CAD BI: CPT | Performed by: SURGERY

## 2022-08-29 PROCEDURE — 77062 BREAST TOMOSYNTHESIS BI: CPT | Performed by: SURGERY

## 2022-10-20 DIAGNOSIS — I10 ESSENTIAL HYPERTENSION: ICD-10-CM

## 2022-10-20 RX ORDER — LOSARTAN POTASSIUM 50 MG/1
50 TABLET ORAL DAILY
Qty: 90 TABLET | Refills: 0 | Status: SHIPPED | OUTPATIENT
Start: 2022-10-20

## 2022-10-20 NOTE — TELEPHONE ENCOUNTER
Last time medication was refilled 5/16/22  Quantity and # of refills 90/0  Last OV 5/18/22  Next OV overdue since 10/18/22

## 2022-11-23 DIAGNOSIS — I10 ESSENTIAL HYPERTENSION: ICD-10-CM

## 2022-11-23 RX ORDER — AMLODIPINE BESYLATE 5 MG/1
5 TABLET ORAL DAILY
Qty: 90 TABLET | Refills: 0 | Status: SHIPPED | OUTPATIENT
Start: 2022-11-23

## 2022-11-23 NOTE — TELEPHONE ENCOUNTER
Last time medication was refilled 8/23/22  Quantity and # of refills 90/0  Last OV 5/18/22  Next OV 12/2/22

## 2022-12-02 ENCOUNTER — OFFICE VISIT (OUTPATIENT)
Dept: INTERNAL MEDICINE CLINIC | Facility: CLINIC | Age: 52
End: 2022-12-02
Payer: COMMERCIAL

## 2022-12-02 VITALS
OXYGEN SATURATION: 98 % | TEMPERATURE: 98 F | SYSTOLIC BLOOD PRESSURE: 134 MMHG | HEIGHT: 65 IN | BODY MASS INDEX: 46.98 KG/M2 | HEART RATE: 78 BPM | DIASTOLIC BLOOD PRESSURE: 78 MMHG | WEIGHT: 282 LBS | RESPIRATION RATE: 16 BRPM

## 2022-12-02 DIAGNOSIS — Z13.0 SCREENING, IRON DEFICIENCY ANEMIA: ICD-10-CM

## 2022-12-02 DIAGNOSIS — Z23 NEED FOR PNEUMOCOCCAL VACCINATION: ICD-10-CM

## 2022-12-02 DIAGNOSIS — Z13.89 SCREENING FOR GENITOURINARY CONDITION: ICD-10-CM

## 2022-12-02 DIAGNOSIS — Z13.220 LIPID SCREENING: ICD-10-CM

## 2022-12-02 DIAGNOSIS — Z00.00 ANNUAL PHYSICAL EXAM: Primary | ICD-10-CM

## 2022-12-02 DIAGNOSIS — Z00.00 LABORATORY EXAMINATION ORDERED AS PART OF A ROUTINE GENERAL MEDICAL EXAMINATION: ICD-10-CM

## 2022-12-02 DIAGNOSIS — Z13.29 THYROID DISORDER SCREEN: ICD-10-CM

## 2022-12-02 PROCEDURE — 90677 PCV20 VACCINE IM: CPT | Performed by: INTERNAL MEDICINE

## 2022-12-02 PROCEDURE — 3075F SYST BP GE 130 - 139MM HG: CPT | Performed by: INTERNAL MEDICINE

## 2022-12-02 PROCEDURE — 3078F DIAST BP <80 MM HG: CPT | Performed by: INTERNAL MEDICINE

## 2022-12-02 PROCEDURE — 3008F BODY MASS INDEX DOCD: CPT | Performed by: INTERNAL MEDICINE

## 2022-12-02 PROCEDURE — 90471 IMMUNIZATION ADMIN: CPT | Performed by: INTERNAL MEDICINE

## 2022-12-02 PROCEDURE — 99396 PREV VISIT EST AGE 40-64: CPT | Performed by: INTERNAL MEDICINE

## 2022-12-10 ENCOUNTER — LAB ENCOUNTER (OUTPATIENT)
Dept: LAB | Age: 52
End: 2022-12-10
Attending: INTERNAL MEDICINE
Payer: COMMERCIAL

## 2022-12-10 DIAGNOSIS — Z13.89 SCREENING FOR GENITOURINARY CONDITION: ICD-10-CM

## 2022-12-10 DIAGNOSIS — Z13.220 LIPID SCREENING: ICD-10-CM

## 2022-12-10 DIAGNOSIS — Z00.00 LABORATORY EXAMINATION ORDERED AS PART OF A ROUTINE GENERAL MEDICAL EXAMINATION: ICD-10-CM

## 2022-12-10 DIAGNOSIS — Z13.0 SCREENING, IRON DEFICIENCY ANEMIA: ICD-10-CM

## 2022-12-10 DIAGNOSIS — Z13.29 THYROID DISORDER SCREEN: ICD-10-CM

## 2022-12-10 LAB
ALBUMIN SERPL-MCNC: 3.4 G/DL (ref 3.4–5)
ALBUMIN/GLOB SERPL: 1 {RATIO} (ref 1–2)
ALP LIVER SERPL-CCNC: 64 U/L
ALT SERPL-CCNC: 23 U/L
ANION GAP SERPL CALC-SCNC: 6 MMOL/L (ref 0–18)
AST SERPL-CCNC: 16 U/L (ref 15–37)
BASOPHILS # BLD AUTO: 0.04 X10(3) UL (ref 0–0.2)
BASOPHILS NFR BLD AUTO: 0.5 %
BILIRUB SERPL-MCNC: 0.3 MG/DL (ref 0.1–2)
BILIRUB UR QL STRIP.AUTO: NEGATIVE
BUN BLD-MCNC: 11 MG/DL (ref 7–18)
CALCIUM BLD-MCNC: 9.1 MG/DL (ref 8.5–10.1)
CHLORIDE SERPL-SCNC: 108 MMOL/L (ref 98–112)
CHOLEST SERPL-MCNC: 218 MG/DL (ref ?–200)
CO2 SERPL-SCNC: 25 MMOL/L (ref 21–32)
COLOR UR AUTO: YELLOW
CREAT BLD-MCNC: 0.91 MG/DL
EOSINOPHIL # BLD AUTO: 0.47 X10(3) UL (ref 0–0.7)
EOSINOPHIL NFR BLD AUTO: 6.2 %
ERYTHROCYTE [DISTWIDTH] IN BLOOD BY AUTOMATED COUNT: 18.1 %
EST. AVERAGE GLUCOSE BLD GHB EST-MCNC: 123 MG/DL (ref 68–126)
FASTING PATIENT LIPID ANSWER: YES
FASTING STATUS PATIENT QL REPORTED: YES
GFR SERPLBLD BASED ON 1.73 SQ M-ARVRAT: 76 ML/MIN/1.73M2 (ref 60–?)
GLOBULIN PLAS-MCNC: 3.5 G/DL (ref 2.8–4.4)
GLUCOSE BLD-MCNC: 94 MG/DL (ref 70–99)
GLUCOSE UR STRIP.AUTO-MCNC: NEGATIVE MG/DL
HBA1C MFR BLD: 5.9 % (ref ?–5.7)
HCT VFR BLD AUTO: 39.4 %
HDLC SERPL-MCNC: 55 MG/DL (ref 40–59)
HGB BLD-MCNC: 12.1 G/DL
IMM GRANULOCYTES # BLD AUTO: 0.02 X10(3) UL (ref 0–1)
IMM GRANULOCYTES NFR BLD: 0.3 %
KETONES UR STRIP.AUTO-MCNC: NEGATIVE MG/DL
LDLC SERPL CALC-MCNC: 132 MG/DL (ref ?–100)
LYMPHOCYTES # BLD AUTO: 1.99 X10(3) UL (ref 1–4)
LYMPHOCYTES NFR BLD AUTO: 26.1 %
MCH RBC QN AUTO: 23.4 PG (ref 26–34)
MCHC RBC AUTO-ENTMCNC: 30.7 G/DL (ref 31–37)
MCV RBC AUTO: 76.1 FL
MONOCYTES # BLD AUTO: 0.42 X10(3) UL (ref 0.1–1)
MONOCYTES NFR BLD AUTO: 5.5 %
NEUTROPHILS # BLD AUTO: 4.68 X10 (3) UL (ref 1.5–7.7)
NEUTROPHILS # BLD AUTO: 4.68 X10(3) UL (ref 1.5–7.7)
NEUTROPHILS NFR BLD AUTO: 61.4 %
NITRITE UR QL STRIP.AUTO: NEGATIVE
NONHDLC SERPL-MCNC: 163 MG/DL (ref ?–130)
OSMOLALITY SERPL CALC.SUM OF ELEC: 287 MOSM/KG (ref 275–295)
PH UR STRIP.AUTO: 6 [PH] (ref 5–8)
PLATELET # BLD AUTO: 377 10(3)UL (ref 150–450)
POTASSIUM SERPL-SCNC: 4.3 MMOL/L (ref 3.5–5.1)
PROT SERPL-MCNC: 6.9 G/DL (ref 6.4–8.2)
PROT UR STRIP.AUTO-MCNC: NEGATIVE MG/DL
RBC # BLD AUTO: 5.18 X10(6)UL
RBC UR QL AUTO: NEGATIVE
SODIUM SERPL-SCNC: 139 MMOL/L (ref 136–145)
SP GR UR STRIP.AUTO: 1.02 (ref 1–1.03)
TRIGL SERPL-MCNC: 175 MG/DL (ref 30–149)
TSI SER-ACNC: 2.63 MIU/ML (ref 0.36–3.74)
UROBILINOGEN UR STRIP.AUTO-MCNC: 0.2 MG/DL
VLDLC SERPL CALC-MCNC: 32 MG/DL (ref 0–30)
WBC # BLD AUTO: 7.6 X10(3) UL (ref 4–11)
WBC #/AREA URNS AUTO: >50 /HPF
WBC #/AREA URNS AUTO: >50 /HPF

## 2022-12-10 PROCEDURE — 80061 LIPID PANEL: CPT | Performed by: INTERNAL MEDICINE

## 2022-12-10 PROCEDURE — 81001 URINALYSIS AUTO W/SCOPE: CPT | Performed by: INTERNAL MEDICINE

## 2022-12-10 PROCEDURE — 83036 HEMOGLOBIN GLYCOSYLATED A1C: CPT | Performed by: INTERNAL MEDICINE

## 2022-12-10 PROCEDURE — 80050 GENERAL HEALTH PANEL: CPT | Performed by: INTERNAL MEDICINE

## 2022-12-11 PROBLEM — Z91.89 10 YEAR RISK OF MI OR STROKE < 7.5%: Status: ACTIVE | Noted: 2022-12-11

## 2023-01-04 DIAGNOSIS — R05.9 COUGH: ICD-10-CM

## 2023-01-04 RX ORDER — ALBUTEROL SULFATE 90 UG/1
2 AEROSOL, METERED RESPIRATORY (INHALATION)
Qty: 1 EACH | Refills: 0 | Status: SHIPPED | OUTPATIENT
Start: 2023-01-04

## 2023-01-19 ENCOUNTER — PATIENT MESSAGE (OUTPATIENT)
Dept: INTERNAL MEDICINE CLINIC | Facility: CLINIC | Age: 53
End: 2023-01-19

## 2023-01-19 NOTE — TELEPHONE ENCOUNTER
From: Lubertha Duane  To: Dank Chang MD  Sent: 1/19/2023 10:12 AM CST  Subject: Throbbing sound in ears    I mentioned to my  that the last 3 days I have been hearing a throbbing/whooshing sound in my ears when sitting in quiet to notice it. I have been taking my Bp meds regularly and my heart rate is usually 85-93 bpm or so. Should I be concerned or take any actions?     Wander Rivera

## 2023-01-20 ENCOUNTER — OFFICE VISIT (OUTPATIENT)
Dept: INTERNAL MEDICINE CLINIC | Facility: CLINIC | Age: 53
End: 2023-01-20
Payer: COMMERCIAL

## 2023-01-20 ENCOUNTER — PATIENT MESSAGE (OUTPATIENT)
Dept: INTERNAL MEDICINE CLINIC | Facility: CLINIC | Age: 53
End: 2023-01-20

## 2023-01-20 VITALS
SYSTOLIC BLOOD PRESSURE: 120 MMHG | DIASTOLIC BLOOD PRESSURE: 80 MMHG | RESPIRATION RATE: 14 BRPM | HEART RATE: 70 BPM | TEMPERATURE: 98 F

## 2023-01-20 DIAGNOSIS — I10 ESSENTIAL HYPERTENSION: Primary | ICD-10-CM

## 2023-01-20 DIAGNOSIS — J98.01 POST-INFECTION BRONCHOSPASM: ICD-10-CM

## 2023-01-20 PROCEDURE — 99214 OFFICE O/P EST MOD 30 MIN: CPT | Performed by: INTERNAL MEDICINE

## 2023-01-20 PROCEDURE — 3074F SYST BP LT 130 MM HG: CPT | Performed by: INTERNAL MEDICINE

## 2023-01-20 PROCEDURE — 3079F DIAST BP 80-89 MM HG: CPT | Performed by: INTERNAL MEDICINE

## 2023-01-20 RX ORDER — BUDESONIDE, GLYCOPYRROLATE, AND FORMOTEROL FUMARATE 160; 9; 4.8 UG/1; UG/1; UG/1
2 AEROSOL, METERED RESPIRATORY (INHALATION) 2 TIMES DAILY
Qty: 1 EACH | Refills: 0 | Status: SHIPPED | OUTPATIENT
Start: 2023-01-20

## 2023-01-23 RX ORDER — BUDESONIDE AND FORMOTEROL FUMARATE DIHYDRATE 80; 4.5 UG/1; UG/1
2 AEROSOL RESPIRATORY (INHALATION) 2 TIMES DAILY
Qty: 1 EACH | Refills: 0 | Status: SHIPPED | OUTPATIENT
Start: 2023-01-23

## 2023-02-06 DIAGNOSIS — I10 ESSENTIAL HYPERTENSION: ICD-10-CM

## 2023-02-06 RX ORDER — LOSARTAN POTASSIUM 50 MG/1
50 TABLET ORAL DAILY
Qty: 90 TABLET | Refills: 0 | Status: SHIPPED | OUTPATIENT
Start: 2023-02-06

## 2023-03-08 DIAGNOSIS — I10 ESSENTIAL HYPERTENSION: ICD-10-CM

## 2023-03-09 ENCOUNTER — OFFICE VISIT (OUTPATIENT)
Dept: INTERNAL MEDICINE CLINIC | Facility: CLINIC | Age: 53
End: 2023-03-09
Payer: COMMERCIAL

## 2023-03-09 VITALS
WEIGHT: 282 LBS | HEART RATE: 90 BPM | DIASTOLIC BLOOD PRESSURE: 70 MMHG | HEIGHT: 65 IN | SYSTOLIC BLOOD PRESSURE: 130 MMHG | BODY MASS INDEX: 46.98 KG/M2 | OXYGEN SATURATION: 98 % | RESPIRATION RATE: 16 BRPM | TEMPERATURE: 98 F

## 2023-03-09 DIAGNOSIS — H65.93 FLUID LEVEL BEHIND TYMPANIC MEMBRANE OF BOTH EARS: ICD-10-CM

## 2023-03-09 DIAGNOSIS — J01.00 ACUTE NON-RECURRENT MAXILLARY SINUSITIS: Primary | ICD-10-CM

## 2023-03-09 DIAGNOSIS — R05.1 ACUTE COUGH: ICD-10-CM

## 2023-03-09 LAB
COVID19 BINAX NOW ANTIGEN: NOT DETECTED
OPERATOR ID: NORMAL
POCT LOT NUMBER: NORMAL

## 2023-03-09 PROCEDURE — 99213 OFFICE O/P EST LOW 20 MIN: CPT

## 2023-03-09 PROCEDURE — 3078F DIAST BP <80 MM HG: CPT

## 2023-03-09 PROCEDURE — 3075F SYST BP GE 130 - 139MM HG: CPT

## 2023-03-09 PROCEDURE — 3008F BODY MASS INDEX DOCD: CPT

## 2023-03-09 RX ORDER — AZITHROMYCIN 250 MG/1
TABLET, FILM COATED ORAL
Qty: 6 TABLET | Refills: 0 | Status: SHIPPED | OUTPATIENT
Start: 2023-03-09 | End: 2023-03-14

## 2023-03-09 RX ORDER — AMLODIPINE BESYLATE 5 MG/1
5 TABLET ORAL DAILY
Qty: 90 TABLET | Refills: 0 | Status: SHIPPED | OUTPATIENT
Start: 2023-03-09

## 2023-03-09 RX ORDER — PREDNISONE 20 MG/1
40 TABLET ORAL DAILY
Qty: 14 TABLET | Refills: 0 | Status: SHIPPED | OUTPATIENT
Start: 2023-03-09 | End: 2023-03-16

## 2023-03-09 RX ORDER — BENZONATATE 200 MG/1
200 CAPSULE ORAL 3 TIMES DAILY PRN
Qty: 20 CAPSULE | Refills: 0 | Status: SHIPPED | OUTPATIENT
Start: 2023-03-09

## 2023-03-09 RX ORDER — CODEINE PHOSPHATE AND GUAIFENESIN 10; 100 MG/5ML; MG/5ML
5 SOLUTION ORAL NIGHTLY PRN
Qty: 120 ML | Refills: 0 | Status: SHIPPED | OUTPATIENT
Start: 2023-03-09

## 2023-03-15 ENCOUNTER — PATIENT MESSAGE (OUTPATIENT)
Dept: INTERNAL MEDICINE CLINIC | Facility: CLINIC | Age: 53
End: 2023-03-15

## 2023-03-15 NOTE — TELEPHONE ENCOUNTER
From: Donley Ormond Kitchings  To: KEDAR Saravia  Sent: 3/15/2023 3:25 PM CDT  Subject: Prednisone     Hi,   After the first full dose of the prednisone taken I continued to struggle to sleep. So, I cut the prescribed dose in half, essentially taking one pill each day vs. two. I am done with my z -ingrid and would probably be done with the prednisone by now if I had co tinier taking the full dose prescribed. Do I need to continue taking the prednisone or can I stop taking it. Symptoms are improving, not completely gone. Sleep is better, although not up to my usual quality and length.     Thank you,  Kwan Ackerman

## 2023-05-18 DIAGNOSIS — I10 ESSENTIAL HYPERTENSION: ICD-10-CM

## 2023-05-18 RX ORDER — LOSARTAN POTASSIUM 50 MG/1
50 TABLET ORAL DAILY
Qty: 90 TABLET | Refills: 0 | Status: SHIPPED | OUTPATIENT
Start: 2023-05-18

## 2023-05-26 ENCOUNTER — TELEMEDICINE (OUTPATIENT)
Dept: INTERNAL MEDICINE CLINIC | Facility: CLINIC | Age: 53
End: 2023-05-26
Payer: COMMERCIAL

## 2023-05-26 DIAGNOSIS — J06.9 UPPER RESPIRATORY VIRUS: Primary | ICD-10-CM

## 2023-05-26 DIAGNOSIS — R05.1 ACUTE COUGH: ICD-10-CM

## 2023-05-26 RX ORDER — CODEINE PHOSPHATE AND GUAIFENESIN 10; 100 MG/5ML; MG/5ML
5 SOLUTION ORAL NIGHTLY PRN
Qty: 120 ML | Refills: 0 | Status: SHIPPED | OUTPATIENT
Start: 2023-05-26

## 2023-07-25 ENCOUNTER — TELEPHONE (OUTPATIENT)
Dept: INTERNAL MEDICINE CLINIC | Facility: CLINIC | Age: 53
End: 2023-07-25

## 2023-07-25 ENCOUNTER — OFFICE VISIT (OUTPATIENT)
Dept: INTERNAL MEDICINE CLINIC | Facility: CLINIC | Age: 53
End: 2023-07-25
Payer: COMMERCIAL

## 2023-07-25 VITALS
OXYGEN SATURATION: 97 % | SYSTOLIC BLOOD PRESSURE: 122 MMHG | BODY MASS INDEX: 47.98 KG/M2 | HEART RATE: 84 BPM | TEMPERATURE: 98 F | WEIGHT: 288 LBS | RESPIRATION RATE: 18 BRPM | DIASTOLIC BLOOD PRESSURE: 70 MMHG | HEIGHT: 65 IN

## 2023-07-25 DIAGNOSIS — E66.01 CLASS 3 SEVERE OBESITY DUE TO EXCESS CALORIES WITHOUT SERIOUS COMORBIDITY WITH BODY MASS INDEX (BMI) OF 45.0 TO 49.9 IN ADULT (HCC): ICD-10-CM

## 2023-07-25 DIAGNOSIS — Z00.00 ROUTINE GENERAL MEDICAL EXAMINATION AT A HEALTH CARE FACILITY: ICD-10-CM

## 2023-07-25 DIAGNOSIS — I10 ESSENTIAL HYPERTENSION: Primary | ICD-10-CM

## 2023-07-25 DIAGNOSIS — Z23 NEED FOR TDAP VACCINATION: ICD-10-CM

## 2023-07-25 DIAGNOSIS — Z12.31 ENCOUNTER FOR SCREENING MAMMOGRAM FOR MALIGNANT NEOPLASM OF BREAST: ICD-10-CM

## 2023-07-25 PROCEDURE — 99213 OFFICE O/P EST LOW 20 MIN: CPT | Performed by: INTERNAL MEDICINE

## 2023-07-25 PROCEDURE — 90471 IMMUNIZATION ADMIN: CPT | Performed by: INTERNAL MEDICINE

## 2023-07-25 PROCEDURE — 3074F SYST BP LT 130 MM HG: CPT | Performed by: INTERNAL MEDICINE

## 2023-07-25 PROCEDURE — 90715 TDAP VACCINE 7 YRS/> IM: CPT | Performed by: INTERNAL MEDICINE

## 2023-07-25 PROCEDURE — 3078F DIAST BP <80 MM HG: CPT | Performed by: INTERNAL MEDICINE

## 2023-07-25 PROCEDURE — 3008F BODY MASS INDEX DOCD: CPT | Performed by: INTERNAL MEDICINE

## 2023-07-25 NOTE — TELEPHONE ENCOUNTER
semaglutide-weight management 0.25 MG/0.5ML Subcutaneous Solution Auto-injector   Requires PA. Unable to complete via Epic. ID: [id number]  P: [phone]    Spoke to representative. Medication is under plan exclusion. Not covered under plan. Detailed message sent to patient making them aware.

## 2023-08-02 ENCOUNTER — PATIENT MESSAGE (OUTPATIENT)
Dept: INTERNAL MEDICINE CLINIC | Facility: CLINIC | Age: 53
End: 2023-08-02

## 2023-08-14 DIAGNOSIS — I10 ESSENTIAL HYPERTENSION: ICD-10-CM

## 2023-08-14 RX ORDER — LOSARTAN POTASSIUM 50 MG/1
50 TABLET ORAL DAILY
Qty: 90 TABLET | Refills: 0 | Status: SHIPPED | OUTPATIENT
Start: 2023-08-14

## 2023-08-29 ENCOUNTER — HOSPITAL ENCOUNTER (OUTPATIENT)
Dept: MAMMOGRAPHY | Age: 53
Discharge: HOME | End: 2023-08-29
Attending: INTERNAL MEDICINE
Payer: COMMERCIAL

## 2023-08-29 DIAGNOSIS — Z12.31 ENCOUNTER FOR SCREENING MAMMOGRAM FOR MALIGNANT NEOPLASM OF BREAST: ICD-10-CM

## 2023-08-29 PROCEDURE — 77067 SCR MAMMO BI INCL CAD: CPT | Performed by: INTERNAL MEDICINE

## 2023-08-29 PROCEDURE — 77063 BREAST TOMOSYNTHESIS BI: CPT | Performed by: INTERNAL MEDICINE

## 2023-09-18 ENCOUNTER — HOSPITAL ENCOUNTER (OUTPATIENT)
Dept: MAMMOGRAPHY | Age: 53
Discharge: HOME | End: 2023-09-18
Attending: INTERNAL MEDICINE
Payer: COMMERCIAL

## 2023-09-18 ENCOUNTER — HOSPITAL ENCOUNTER (OUTPATIENT)
Dept: ULTRASOUND IMAGING | Age: 53
Discharge: HOME | End: 2023-09-18
Attending: INTERNAL MEDICINE
Payer: COMMERCIAL

## 2023-09-18 DIAGNOSIS — R92.2 INCONCLUSIVE MAMMOGRAM: ICD-10-CM

## 2023-09-18 PROCEDURE — 77065 DX MAMMO INCL CAD UNI: CPT | Performed by: INTERNAL MEDICINE

## 2023-09-18 PROCEDURE — 77061 BREAST TOMOSYNTHESIS UNI: CPT | Performed by: INTERNAL MEDICINE

## 2023-09-18 PROCEDURE — 76642 ULTRASOUND BREAST LIMITED: CPT | Performed by: INTERNAL MEDICINE

## 2023-12-01 ENCOUNTER — LAB ENCOUNTER (OUTPATIENT)
Dept: LAB | Age: 53
End: 2023-12-01
Attending: INTERNAL MEDICINE
Payer: COMMERCIAL

## 2023-12-01 DIAGNOSIS — Z00.00 ROUTINE GENERAL MEDICAL EXAMINATION AT A HEALTH CARE FACILITY: ICD-10-CM

## 2023-12-01 LAB
ALBUMIN SERPL-MCNC: 3.4 G/DL (ref 3.4–5)
ALBUMIN/GLOB SERPL: 0.9 {RATIO} (ref 1–2)
ALP LIVER SERPL-CCNC: 74 U/L
ALT SERPL-CCNC: 24 U/L
ANION GAP SERPL CALC-SCNC: 4 MMOL/L (ref 0–18)
AST SERPL-CCNC: 18 U/L (ref 15–37)
BASOPHILS # BLD AUTO: 0.06 X10(3) UL (ref 0–0.2)
BASOPHILS NFR BLD AUTO: 0.9 %
BILIRUB SERPL-MCNC: 0.6 MG/DL (ref 0.1–2)
BILIRUB UR QL STRIP.AUTO: NEGATIVE
BUN BLD-MCNC: 14 MG/DL (ref 9–23)
CALCIUM BLD-MCNC: 9 MG/DL (ref 8.5–10.1)
CHLORIDE SERPL-SCNC: 107 MMOL/L (ref 98–112)
CHOLEST SERPL-MCNC: 205 MG/DL (ref ?–200)
CLARITY UR REFRACT.AUTO: CLEAR
CO2 SERPL-SCNC: 27 MMOL/L (ref 21–32)
CREAT BLD-MCNC: 0.89 MG/DL
EGFRCR SERPLBLD CKD-EPI 2021: 78 ML/MIN/1.73M2 (ref 60–?)
EOSINOPHIL # BLD AUTO: 0.28 X10(3) UL (ref 0–0.7)
EOSINOPHIL NFR BLD AUTO: 4 %
ERYTHROCYTE [DISTWIDTH] IN BLOOD BY AUTOMATED COUNT: 16.6 %
FASTING PATIENT LIPID ANSWER: YES
FASTING STATUS PATIENT QL REPORTED: YES
GLOBULIN PLAS-MCNC: 3.9 G/DL (ref 2.8–4.4)
GLUCOSE BLD-MCNC: 95 MG/DL (ref 70–99)
GLUCOSE UR STRIP.AUTO-MCNC: NORMAL MG/DL
HCT VFR BLD AUTO: 42.1 %
HDLC SERPL-MCNC: 55 MG/DL (ref 40–59)
HGB BLD-MCNC: 13.1 G/DL
IMM GRANULOCYTES # BLD AUTO: 0.02 X10(3) UL (ref 0–1)
IMM GRANULOCYTES NFR BLD: 0.3 %
KETONES UR STRIP.AUTO-MCNC: NEGATIVE MG/DL
LDLC SERPL CALC-MCNC: 107 MG/DL (ref ?–100)
LEUKOCYTE ESTERASE UR QL STRIP.AUTO: 500
LYMPHOCYTES # BLD AUTO: 2.45 X10(3) UL (ref 1–4)
LYMPHOCYTES NFR BLD AUTO: 35.2 %
MCH RBC QN AUTO: 24.3 PG (ref 26–34)
MCHC RBC AUTO-ENTMCNC: 31.1 G/DL (ref 31–37)
MCV RBC AUTO: 78.3 FL
MONOCYTES # BLD AUTO: 0.42 X10(3) UL (ref 0.1–1)
MONOCYTES NFR BLD AUTO: 6 %
NEUTROPHILS # BLD AUTO: 3.73 X10 (3) UL (ref 1.5–7.7)
NEUTROPHILS # BLD AUTO: 3.73 X10(3) UL (ref 1.5–7.7)
NEUTROPHILS NFR BLD AUTO: 53.6 %
NITRITE UR QL STRIP.AUTO: NEGATIVE
NONHDLC SERPL-MCNC: 150 MG/DL (ref ?–130)
OSMOLALITY SERPL CALC.SUM OF ELEC: 286 MOSM/KG (ref 275–295)
PH UR STRIP.AUTO: 6.5 [PH] (ref 5–8)
PLATELET # BLD AUTO: 358 10(3)UL (ref 150–450)
POTASSIUM SERPL-SCNC: 4.3 MMOL/L (ref 3.5–5.1)
PROT SERPL-MCNC: 7.3 G/DL (ref 6.4–8.2)
PROT UR STRIP.AUTO-MCNC: NEGATIVE MG/DL
RBC # BLD AUTO: 5.38 X10(6)UL
RBC UR QL AUTO: NEGATIVE
SODIUM SERPL-SCNC: 138 MMOL/L (ref 136–145)
SP GR UR STRIP.AUTO: 1.02 (ref 1–1.03)
TRIGL SERPL-MCNC: 252 MG/DL (ref 30–149)
TSI SER-ACNC: 2.73 MIU/ML (ref 0.36–3.74)
UROBILINOGEN UR STRIP.AUTO-MCNC: NORMAL MG/DL
VLDLC SERPL CALC-MCNC: 43 MG/DL (ref 0–30)
WBC # BLD AUTO: 7 X10(3) UL (ref 4–11)

## 2023-12-01 PROCEDURE — 85025 COMPLETE CBC W/AUTO DIFF WBC: CPT

## 2023-12-01 PROCEDURE — 80053 COMPREHEN METABOLIC PANEL: CPT

## 2023-12-01 PROCEDURE — 80061 LIPID PANEL: CPT

## 2023-12-01 PROCEDURE — 84443 ASSAY THYROID STIM HORMONE: CPT

## 2023-12-01 PROCEDURE — 81001 URINALYSIS AUTO W/SCOPE: CPT

## 2023-12-01 PROCEDURE — 36415 COLL VENOUS BLD VENIPUNCTURE: CPT

## 2023-12-07 ENCOUNTER — OFFICE VISIT (OUTPATIENT)
Dept: INTERNAL MEDICINE CLINIC | Facility: CLINIC | Age: 53
End: 2023-12-07
Payer: COMMERCIAL

## 2023-12-07 VITALS
HEART RATE: 80 BPM | HEIGHT: 65 IN | OXYGEN SATURATION: 99 % | BODY MASS INDEX: 48.48 KG/M2 | SYSTOLIC BLOOD PRESSURE: 122 MMHG | DIASTOLIC BLOOD PRESSURE: 84 MMHG | TEMPERATURE: 97 F | RESPIRATION RATE: 20 BRPM | WEIGHT: 291 LBS

## 2023-12-07 DIAGNOSIS — Z00.00 ANNUAL PHYSICAL EXAM: Primary | ICD-10-CM

## 2023-12-07 DIAGNOSIS — I10 ESSENTIAL HYPERTENSION: ICD-10-CM

## 2023-12-07 PROCEDURE — 3079F DIAST BP 80-89 MM HG: CPT | Performed by: INTERNAL MEDICINE

## 2023-12-07 PROCEDURE — 99396 PREV VISIT EST AGE 40-64: CPT | Performed by: INTERNAL MEDICINE

## 2023-12-07 PROCEDURE — 3008F BODY MASS INDEX DOCD: CPT | Performed by: INTERNAL MEDICINE

## 2023-12-07 PROCEDURE — 3074F SYST BP LT 130 MM HG: CPT | Performed by: INTERNAL MEDICINE

## 2023-12-07 RX ORDER — LOSARTAN POTASSIUM 50 MG/1
50 TABLET ORAL DAILY
Qty: 90 TABLET | Refills: 3 | Status: SHIPPED | OUTPATIENT
Start: 2023-12-07

## 2023-12-12 ENCOUNTER — TELEPHONE (OUTPATIENT)
Dept: INTERNAL MEDICINE CLINIC | Facility: CLINIC | Age: 53
End: 2023-12-12

## 2023-12-12 ENCOUNTER — PATIENT OUTREACH (OUTPATIENT)
Dept: CASE MANAGEMENT | Age: 53
End: 2023-12-12

## 2023-12-13 NOTE — PROCEDURES
The office order for PCP removal request is Approved and finalized on December 12, 2023.     Thanks,  [facility] [employer]

## 2024-07-31 ENCOUNTER — TELEPHONE (OUTPATIENT)
Dept: INTERNAL MEDICINE CLINIC | Facility: CLINIC | Age: 54
End: 2024-07-31

## 2024-07-31 NOTE — TELEPHONE ENCOUNTER
MEDICAL REQUEST FORM SENT FROM [facility]    ORIGINAL FAX SENT TO [facility]

## (undated) DIAGNOSIS — I10 ESSENTIAL HYPERTENSION: ICD-10-CM

## (undated) DEVICE — Device

## (undated) DEVICE — BREAST-HERNIA-PORT CDS-LF: Brand: MEDLINE INDUSTRIES, INC.

## (undated) DEVICE — UNDYED BRAIDED (POLYGLACTIN 910), SYNTHETIC ABSORBABLE SUTURE: Brand: COATED VICRYL

## (undated) DEVICE — FILTERLINE NASAL ADULT O2/CO2

## (undated) DEVICE — SNARE CAPTIFLEX MICRO-OVL OLY

## (undated) DEVICE — CONT SPEC SURG FAXITRON WEDGE

## (undated) DEVICE — FORCEP BIOPSY RJ4 LG CAP W/ND

## (undated) DEVICE — SUTURE SILK 2-0 SH

## (undated) DEVICE — CHLORAPREP 26ML APPLICATOR

## (undated) DEVICE — 1200CC GUARDIAN II: Brand: GUARDIAN

## (undated) DEVICE — 3M™ RED DOT™ MONITORING ELECTRODE WITH FOAM TAPE AND STICKY GEL, 50/BAG, 20/CASE, 72/PLT 2570: Brand: RED DOT™

## (undated) DEVICE — KENDALL SCD EXPRESS SLEEVES, KNEE LENGTH, MEDIUM: Brand: KENDALL SCD

## (undated) DEVICE — CLIP LGT 11MM OPEN 2.8MM 235CM

## (undated) DEVICE — Device: Brand: DEFENDO AIR/WATER/SUCTION AND BIOPSY VALVE

## (undated) DEVICE — #11 STERILE BLADE: Brand: POLYMER COATED BLADES

## (undated) DEVICE — REM POLYHESIVE ADULT PATIENT RETURN ELECTRODE: Brand: VALLEYLAB

## (undated) DEVICE — SUTURE VICRYL 3-0 SH

## (undated) DEVICE — 3M(TM) MICROPORE TAPE DISPENSER 1535-2: Brand: 3M™ MICROPORE™

## (undated) DEVICE — ENDOSCOPY PACK - LOWER: Brand: MEDLINE INDUSTRIES, INC.

## (undated) DEVICE — TRAP 4 CPTR CHMBR N EZ INLN

## (undated) DEVICE — GAMMEX® NON-LATEX PI TEXTURED SIZE 6.5, STERILE POLYISOPRENE POWDER-FREE SURGICAL GLOVE: Brand: GAMMEX

## (undated) DEVICE — SOL  .9 1000ML BTL

## (undated) NOTE — ED AVS SNAPSHOT
[facility] Emergency Department    [address]     [place] [address]    Phone:  [phone]    Fax:  [phone]   MRN: [id number]    Department:  [facility] Emergency Department   Date of Visit:  4/1 to serve you. You are our top priority. You were examined and treated today on an urgent basis only. This was not a substitute for ongoing medical care. Often, one Emergency Department visit does not uncover every injury or illness.  If you have been r [facility]        Pharmacy Address Phone Number   Yoan Chang [address] [facility] ([address]) [address] [facility] Meghann Soto 27) [facility]  Jennifer Angélica [phone]

## (undated) NOTE — LETTER
Discharge Instructions  Stereotactic / Ultrasound / MRI Core Biopsy     1. Place an ice pack on top of the biopsy site in your bra OR the folds of the Ace wrap for 10-15 minutes of every hour until bedtime for your comfort and to decrease bleeding.       2. 11. A 6 month follow-up Diagnostic Mammogram/Ultrasound will be recommended to document stability of the biopsy site. It may be scheduled at [facility] or [facility] [address] by calling [phone].  You will need an order for this exam from yo

## (undated) NOTE — LETTER
08/31/20        [address] [place] [address] [address]      Dear Bossman Moe,    [facility] records indicate that you have outstanding lab work and or testing that was ordered for you and has not yet been completed:  Orders Placed This [place]

## (undated) NOTE — LETTER
20    Patient: Fernie Jules  : 3/19/1970 Visit date: 2020    Dear  Dr. Ladi Ahuja MD,    Thank you for referring Fernie Jules to my practice. Please find my assessment and plan below.            History of Present Illness   51year-old fem

## (undated) NOTE — LETTER
Date: 11/14/2019    Patient Name: Isael Lawson          To Whom it may concern: The above patient was seen at the [facility] and the [facility] for treatment of a medical condition.     This patient should be excused from attending work from 11/14/2019 th

## (undated) NOTE — ED AVS SNAPSHOT
[facility] Emergency Department    [address]     [place] [address]    Phone:  [phone]    Fax:  [phone] [facility]   MRN: [id number]    Department:  [facility] Emergency Department   Date of Visit:  4/1 and Class Registration line at [phone] or find a doctor online by visiting [email or web address].    IF THERE IS ANY CHANGE OR WORSENING OF YOUR CONDITION, CALL YOUR PRIMARY CARE PHYSICIAN AT ONCE OR RETURN IMMEDIATELY TO [address] [place] [address].     If

## (undated) NOTE — LETTER
21    Patient: Adolfo Mckoy  : 3/19/1970 Visit date: 1/15/2021    Dear  Wes Ferro MD    Thank you for referring Adolfo Mckoy to my practice. Please find my assessment and plan below.     History of Present Illness    Today, I MBI was performed 2020 and in the bilateral breast, areas of asymmetric tracer uptake breast and left upper outer quadrant was noted. MRI was recommended  MRI performed January 2021.   Postsurgical changes in the right wrist, scattered right breast cyst la

## (undated) NOTE — LETTER
08/05/22        [address] [place] [address] [address]      Dear Esteban Troncoso,    [address] records indicate that you have outstanding lab work and or testing that was ordered for you and has not yet been completed:  Orders Placed This Encounter      [facility] AISHA 2D+3D DIAGNOSTIC TONEY  BILAT (AXI=[id number])    To provide you with the best possible care, please complete these orders at your earliest convenience. If you have recently completed these orders please disregard this letter. If you have any questions please call the office at Dept: [phone].      Thank you,       Mimi Liu MD

## (undated) NOTE — ED AVS SNAPSHOT
Celeste Estrada   MRN: [id number]    Department:  THE [facility] Emergency Department in [place]   Date of Visit:  11/11/2019           Disclosure     Insurance plans vary and the physician(s) referred by the ER may not be covered by your plan.  Please cont tell this physician (or your personal doctor if your instructions are to return to your personal doctor) about any new or lasting problems. The primary care or specialist physician will see patients referred from the [facility] Emergency Department.  Prasad Torres

## (undated) NOTE — LETTER
April 17, 2017    Patient: Lenora Mccord   Date of Visit: 4/17/2017       To Whom It May Concern:    Kwasi Haas was seen and treated in our emergency department on 4/17/2017. She should not return to work until 4/18/17. .    If you have any quest

## (undated) NOTE — LETTER
21    Patient: Letha Patel  : 3/19/1970 Visit date: 2021    Dear  Luisa Acosta MD    Thank you for referring Letha Patel to my practice. Please find my assessment and plan below.           Sincerely,       PATRICA Martinez

## (undated) NOTE — LETTER
20    Patient: Wolfgang Ewing  : 3/19/1970 Visit date: 2020    Dear  Dr. Ladi Ahuja MD,    Thank you for referring Wolfgang Ewing to my practice. Please find my assessment and plan below.             Assessment   Atypical ductal hyperp examination by a physician. Technique for self examination was reviewed.                Sincerely,       Laz Mitchell MD   CC: No Recipients